# Patient Record
Sex: FEMALE | Race: BLACK OR AFRICAN AMERICAN | Employment: OTHER | URBAN - METROPOLITAN AREA
[De-identification: names, ages, dates, MRNs, and addresses within clinical notes are randomized per-mention and may not be internally consistent; named-entity substitution may affect disease eponyms.]

---

## 2017-08-13 ENCOUNTER — HOSPITAL ENCOUNTER (EMERGENCY)
Age: 82
Discharge: HOME | End: 2017-08-13
Attending: FAMILY MEDICINE

## 2017-08-13 VITALS
WEIGHT: 124 LBS | TEMPERATURE: 97 F | HEART RATE: 62 BPM | HEIGHT: 60 IN | OXYGEN SATURATION: 95 % | DIASTOLIC BLOOD PRESSURE: 56 MMHG | RESPIRATION RATE: 20 BRPM | SYSTOLIC BLOOD PRESSURE: 122 MMHG | BODY MASS INDEX: 24.35 KG/M2

## 2017-08-13 DIAGNOSIS — S00.83XA CONTUSION OF OTHER PART OF HEAD, INITIAL ENCOUNTER: Primary | ICD-10-CM

## 2017-08-13 RX ORDER — GUAIFENESIN 100 MG/5ML
81 LIQUID (ML) ORAL EVERY EVENING
COMMUNITY
End: 2018-02-23

## 2017-08-13 RX ORDER — DIAZEPAM 5 MG/1
5 TABLET ORAL
COMMUNITY
End: 2018-02-21 | Stop reason: DRUGHIGH

## 2017-08-13 NOTE — DISCHARGE INSTRUCTIONS
Head Injury: Care Instructions  Your Care Instructions  Most injuries to the head are minor. Bumps, cuts, and scrapes on the head and face usually heal well and can be treated the same as injuries to other parts of the body. Although it's rare, once in a while a more serious problem shows up after you are home. So it's good to be on the lookout for symptoms for a day or two. Follow-up care is a key part of your treatment and safety. Be sure to make and go to all appointments, and call your doctor if you are having problems. It's also a good idea to know your test results and keep a list of the medicines you take. How can you care for yourself at home? · Follow your doctor's instructions. He or she will tell you if you need someone to watch you closely for the next 24 hours or longer. · Take it easy for the next few days or more if you are not feeling well. · Ask your doctor when it's okay for you to go back to activities like driving a car, riding a bike, or operating machinery. When should you call for help? Call 911 anytime you think you may need emergency care. For example, call if:  · You have a seizure. · You passed out (lost consciousness). · You are confused or can't stay awake. Call your doctor now or seek immediate medical care if:  · You have new or worse vomiting. · You feel less alert. · You have new weakness or numbness in any part of your body. Watch closely for changes in your health, and be sure to contact your doctor if:  · You do not get better as expected. · You have new symptoms, such as headaches, trouble concentrating, or changes in mood. Where can you learn more? Go to http://vladimir-reji.info/. Enter L599 in the search box to learn more about \"Head Injury: Care Instructions. \"  Current as of: October 14, 2016  Content Version: 11.3  © 9626-4010 [employer].  Care instructions adapted under license by [employer] (which disclaims liability or warranty for this information). If you have questions about a medical condition or this instruction, always ask your healthcare professional. [place] [address] any warranty or liability for your use of this information.

## 2017-08-20 NOTE — UC PROVIDER NOTE
Patient is a 80 y.o. female presenting with fall. The history is provided by a caregiver and a relative. The history is limited by the condition of the patient. Fall   The accident occurred 1 to 2 hours ago. The fall occurred while standing. She fell from a height of ground level. She landed on hard floor. There was no blood loss. The point of impact was the head. The pain is mild. She was ambulatory at the scene. There was no entrapment after the fall. There was no drug use involved in the accident. There was no alcohol use involved in the accident. Pertinent negatives include no visual change, no nausea, no vomiting, no headaches, no extremity weakness, no loss of consciousness and no laceration. The risk factors include dementia and being elderly. She has tried ice for the symptoms. Past Medical History:   Diagnosis Date    Alzheimer disease     Hypertension         Past Surgical History:   Procedure Laterality Date    HX ORTHOPAEDIC      bilateral knee replacement         Family History   Problem Relation Age of Onset    Dementia Father         Social History     Social History    Marital status:      Spouse name: N/A    Number of children: N/A    Years of education: N/A     Occupational History    Not on file. Social History Main Topics    Smoking status: Never Smoker    Smokeless tobacco: Never Used    Alcohol use No    Drug use: Not on file    Sexual activity: Not on file     Other Topics Concern    Not on file     Social History Narrative                ALLERGIES: Review of patient's allergies indicates no known allergies. Review of Systems   Gastrointestinal: Negative for nausea and vomiting. Musculoskeletal: Negative for extremity weakness. Neurological: Negative for loss of consciousness and headaches. All other systems reviewed and are negative.       Vitals:    08/13/17 1541 08/13/17 1544   BP:  122/56   Pulse:  62   Resp:  20   Temp:  97 °F (36.1 °C)   SpO2: 95%   Weight: 56.2 kg (124 lb)    Height: 5' (1.524 m)        Physical Exam   Constitutional: She is oriented to person, place, and time. She appears well-developed and well-nourished. HENT:   Head: Normocephalic. Head is with contusion (with mild edema- no bruise and no tenderness). Head is without abrasion and without laceration. Right Ear: External ear normal.   Left Ear: External ear normal.   Mouth/Throat: Oropharynx is clear and moist. No oropharyngeal exudate. Eyes: Conjunctivae and EOM are normal. Pupils are equal, round, and reactive to light. Right eye exhibits no discharge. Left eye exhibits no discharge. No scleral icterus. Neck: Normal range of motion. No tracheal deviation present. No thyromegaly present. Cardiovascular: Normal rate, regular rhythm and normal heart sounds. No murmur heard. Pulmonary/Chest: Effort normal and breath sounds normal. No respiratory distress. She has no wheezes. She has no rales. She exhibits no tenderness. Abdominal: Soft. Bowel sounds are normal. She exhibits no distension. There is no tenderness. There is no rebound and no guarding. Musculoskeletal: Normal range of motion. She exhibits no edema or tenderness. Lymphadenopathy:     She has no cervical adenopathy. Neurological: She is alert and oriented to person, place, and time. No cranial nerve deficit. Coordination normal.   Skin: Skin is warm. No laceration noted. No erythema. Psychiatric: She has a normal mood and affect. Her behavior is normal. Judgment and thought content normal.   Nursing note and vitals reviewed.       MDM     Differential Diagnosis; Clinical Impression; Plan:     CLINICAL IMPRESSION:  Contusion of other part of head, initial encounter  (primary encounter diagnosis)      DDX    Plan:    Reassured- observe mental status and also for N/v  Tylenol/ ice as needed  Amount and/or Complexity of Data Reviewed:    Review and summarize past medical records:  Yes  Risk of Significant Complications, Morbidity, and/or Mortality:   Presenting problems: Moderate  Management options:   Moderate  Progress:   Patient progress:  Stable      Procedures

## 2018-02-21 ENCOUNTER — APPOINTMENT (OUTPATIENT)
Dept: GENERAL RADIOLOGY | Age: 83
DRG: 066 | End: 2018-02-21
Attending: EMERGENCY MEDICINE
Payer: MEDICARE

## 2018-02-21 ENCOUNTER — HOSPITAL ENCOUNTER (INPATIENT)
Age: 83
LOS: 2 days | Discharge: HOME HEALTH | DRG: 066 | End: 2018-02-23
Attending: EMERGENCY MEDICINE | Admitting: INTERNAL MEDICINE
Payer: MEDICARE

## 2018-02-21 ENCOUNTER — APPOINTMENT (OUTPATIENT)
Dept: CT IMAGING | Age: 83
DRG: 066 | End: 2018-02-21
Attending: EMERGENCY MEDICINE
Payer: MEDICARE

## 2018-02-21 DIAGNOSIS — G30.1 LATE ONSET ALZHEIMER'S DISEASE WITHOUT BEHAVIORAL DISTURBANCE (HCC): ICD-10-CM

## 2018-02-21 DIAGNOSIS — R77.8 ELEVATED TROPONIN I LEVEL: ICD-10-CM

## 2018-02-21 DIAGNOSIS — I63.9 CEREBROVASCULAR ACCIDENT (CVA), UNSPECIFIED MECHANISM (HCC): Primary | ICD-10-CM

## 2018-02-21 DIAGNOSIS — F02.80 LATE ONSET ALZHEIMER'S DISEASE WITHOUT BEHAVIORAL DISTURBANCE (HCC): ICD-10-CM

## 2018-02-21 LAB
ANION GAP SERPL CALC-SCNC: 7 MMOL/L (ref 5–15)
APTT PPP: 27.2 SEC (ref 22.1–32)
BASOPHILS # BLD: 0 K/UL (ref 0–0.1)
BASOPHILS NFR BLD: 0 % (ref 0–1)
BUN SERPL-MCNC: 23 MG/DL (ref 6–20)
BUN/CREAT SERPL: 19 (ref 12–20)
CALCIUM SERPL-MCNC: 8.7 MG/DL (ref 8.5–10.1)
CHLORIDE SERPL-SCNC: 111 MMOL/L (ref 97–108)
CK SERPL-CCNC: 80 U/L (ref 26–192)
CO2 SERPL-SCNC: 27 MMOL/L (ref 21–32)
CREAT SERPL-MCNC: 1.22 MG/DL (ref 0.55–1.02)
DIFFERENTIAL METHOD BLD: ABNORMAL
EOSINOPHIL # BLD: 0.2 K/UL (ref 0–0.4)
EOSINOPHIL NFR BLD: 3 % (ref 0–7)
ERYTHROCYTE [DISTWIDTH] IN BLOOD BY AUTOMATED COUNT: 14.6 % (ref 11.5–14.5)
GLUCOSE SERPL-MCNC: 85 MG/DL (ref 65–100)
HCT VFR BLD AUTO: 31.2 % (ref 35–47)
HGB BLD-MCNC: 9.9 G/DL (ref 11.5–16)
IMM GRANULOCYTES # BLD: 0 K/UL (ref 0–0.04)
IMM GRANULOCYTES NFR BLD AUTO: 0 % (ref 0–0.5)
INR PPP: 1 (ref 0.9–1.1)
LYMPHOCYTES # BLD: 1.7 K/UL (ref 0.8–3.5)
LYMPHOCYTES NFR BLD: 29 % (ref 12–49)
MCH RBC QN AUTO: 31 PG (ref 26–34)
MCHC RBC AUTO-ENTMCNC: 31.7 G/DL (ref 30–36.5)
MCV RBC AUTO: 97.8 FL (ref 80–99)
MONOCYTES # BLD: 0.4 K/UL (ref 0–1)
MONOCYTES NFR BLD: 8 % (ref 5–13)
NEUTS SEG # BLD: 3.5 K/UL (ref 1.8–8)
NEUTS SEG NFR BLD: 60 % (ref 32–75)
NRBC # BLD: 0 K/UL (ref 0–0.01)
NRBC BLD-RTO: 0 PER 100 WBC
PLATELET # BLD AUTO: 163 K/UL (ref 150–400)
PMV BLD AUTO: 11.3 FL (ref 8.9–12.9)
POTASSIUM SERPL-SCNC: 4.2 MMOL/L (ref 3.5–5.1)
PROTHROMBIN TIME: 10.3 SEC (ref 9–11.1)
RBC # BLD AUTO: 3.19 M/UL (ref 3.8–5.2)
SODIUM SERPL-SCNC: 145 MMOL/L (ref 136–145)
THERAPEUTIC RANGE,PTTT: NORMAL SECS (ref 58–77)
TROPONIN I SERPL-MCNC: 0.13 NG/ML
WBC # BLD AUTO: 5.8 K/UL (ref 3.6–11)

## 2018-02-21 PROCEDURE — 71045 X-RAY EXAM CHEST 1 VIEW: CPT

## 2018-02-21 PROCEDURE — 36415 COLL VENOUS BLD VENIPUNCTURE: CPT | Performed by: EMERGENCY MEDICINE

## 2018-02-21 PROCEDURE — 85730 THROMBOPLASTIN TIME PARTIAL: CPT | Performed by: EMERGENCY MEDICINE

## 2018-02-21 PROCEDURE — 74011250637 HC RX REV CODE- 250/637: Performed by: EMERGENCY MEDICINE

## 2018-02-21 PROCEDURE — 94762 N-INVAS EAR/PLS OXIMTRY CONT: CPT

## 2018-02-21 PROCEDURE — 65270000029 HC RM PRIVATE

## 2018-02-21 PROCEDURE — 80048 BASIC METABOLIC PNL TOTAL CA: CPT | Performed by: EMERGENCY MEDICINE

## 2018-02-21 PROCEDURE — 84484 ASSAY OF TROPONIN QUANT: CPT | Performed by: EMERGENCY MEDICINE

## 2018-02-21 PROCEDURE — 85025 COMPLETE CBC W/AUTO DIFF WBC: CPT | Performed by: EMERGENCY MEDICINE

## 2018-02-21 PROCEDURE — 96374 THER/PROPH/DIAG INJ IV PUSH: CPT

## 2018-02-21 PROCEDURE — 85610 PROTHROMBIN TIME: CPT | Performed by: EMERGENCY MEDICINE

## 2018-02-21 PROCEDURE — 70450 CT HEAD/BRAIN W/O DYE: CPT

## 2018-02-21 PROCEDURE — 93005 ELECTROCARDIOGRAM TRACING: CPT

## 2018-02-21 PROCEDURE — 99285 EMERGENCY DEPT VISIT HI MDM: CPT

## 2018-02-21 PROCEDURE — 74011250636 HC RX REV CODE- 250/636: Performed by: EMERGENCY MEDICINE

## 2018-02-21 PROCEDURE — 82550 ASSAY OF CK (CPK): CPT | Performed by: EMERGENCY MEDICINE

## 2018-02-21 RX ORDER — ONDANSETRON 2 MG/ML
4 INJECTION INTRAMUSCULAR; INTRAVENOUS
Status: DISCONTINUED | OUTPATIENT
Start: 2018-02-21 | End: 2018-02-23 | Stop reason: HOSPADM

## 2018-02-21 RX ORDER — BISOPROLOL FUMARATE AND HYDROCHLOROTHIAZIDE 5; 6.25 MG/1; MG/1
1 TABLET ORAL DAILY
COMMUNITY

## 2018-02-21 RX ORDER — DIAZEPAM 5 MG/1
5 TABLET ORAL 2 TIMES DAILY
Status: DISCONTINUED | OUTPATIENT
Start: 2018-02-22 | End: 2018-02-23 | Stop reason: HOSPADM

## 2018-02-21 RX ORDER — ACETAMINOPHEN 325 MG/1
650 TABLET ORAL
Status: DISCONTINUED | OUTPATIENT
Start: 2018-02-21 | End: 2018-02-23 | Stop reason: HOSPADM

## 2018-02-21 RX ORDER — HEPARIN SODIUM 5000 [USP'U]/ML
5000 INJECTION, SOLUTION INTRAVENOUS; SUBCUTANEOUS EVERY 8 HOURS
Status: DISCONTINUED | OUTPATIENT
Start: 2018-02-21 | End: 2018-02-23 | Stop reason: HOSPADM

## 2018-02-21 RX ORDER — BISOPROLOL FUMARATE AND HYDROCHLOROTHIAZIDE 5; 6.25 MG/1; MG/1
1 TABLET ORAL DAILY
Status: DISCONTINUED | OUTPATIENT
Start: 2018-02-22 | End: 2018-02-23 | Stop reason: HOSPADM

## 2018-02-21 RX ORDER — LABETALOL HYDROCHLORIDE 5 MG/ML
5 INJECTION, SOLUTION INTRAVENOUS
Status: DISCONTINUED | OUTPATIENT
Start: 2018-02-21 | End: 2018-02-23 | Stop reason: HOSPADM

## 2018-02-21 RX ORDER — ASCORBIC ACID 500 MG
500 TABLET ORAL DAILY
Status: DISCONTINUED | OUTPATIENT
Start: 2018-02-22 | End: 2018-02-23 | Stop reason: HOSPADM

## 2018-02-21 RX ORDER — ASCORBIC ACID 500 MG
500 TABLET ORAL DAILY
COMMUNITY

## 2018-02-21 RX ORDER — ACETAMINOPHEN 650 MG/1
650 SUPPOSITORY RECTAL
Status: DISCONTINUED | OUTPATIENT
Start: 2018-02-21 | End: 2018-02-23 | Stop reason: HOSPADM

## 2018-02-21 RX ORDER — LORAZEPAM 2 MG/ML
1 INJECTION INTRAMUSCULAR ONCE
Status: COMPLETED | OUTPATIENT
Start: 2018-02-21 | End: 2018-02-21

## 2018-02-21 RX ORDER — SODIUM CHLORIDE 9 MG/ML
50 INJECTION, SOLUTION INTRAVENOUS CONTINUOUS
Status: DISPENSED | OUTPATIENT
Start: 2018-02-21 | End: 2018-02-22

## 2018-02-21 RX ORDER — DIAZEPAM 5 MG/5ML
5 SOLUTION ORAL
COMMUNITY

## 2018-02-21 RX ORDER — CLOPIDOGREL BISULFATE 75 MG/1
75 TABLET ORAL DAILY
Status: DISCONTINUED | OUTPATIENT
Start: 2018-02-22 | End: 2018-02-23 | Stop reason: HOSPADM

## 2018-02-21 RX ORDER — ENOXAPARIN SODIUM 100 MG/ML
30 INJECTION SUBCUTANEOUS EVERY 24 HOURS
Status: DISCONTINUED | OUTPATIENT
Start: 2018-02-22 | End: 2018-02-21

## 2018-02-21 RX ORDER — DIAZEPAM 2.5 MG/.5ML
5 GEL RECTAL
Status: DISCONTINUED | OUTPATIENT
Start: 2018-02-21 | End: 2018-02-21

## 2018-02-21 RX ORDER — SODIUM CHLORIDE 0.9 % (FLUSH) 0.9 %
5-10 SYRINGE (ML) INJECTION AS NEEDED
Status: DISCONTINUED | OUTPATIENT
Start: 2018-02-21 | End: 2018-02-23 | Stop reason: HOSPADM

## 2018-02-21 RX ORDER — ASPIRIN 300 MG/1
300 SUPPOSITORY RECTAL
Status: COMPLETED | OUTPATIENT
Start: 2018-02-21 | End: 2018-02-21

## 2018-02-21 RX ORDER — SODIUM CHLORIDE 0.9 % (FLUSH) 0.9 %
5-10 SYRINGE (ML) INJECTION EVERY 8 HOURS
Status: DISCONTINUED | OUTPATIENT
Start: 2018-02-21 | End: 2018-02-23 | Stop reason: HOSPADM

## 2018-02-21 RX ADMIN — LORAZEPAM 1 MG: 2 INJECTION INTRAMUSCULAR; INTRAVENOUS at 21:12

## 2018-02-21 RX ADMIN — ASPIRIN 300 MG: 300 SUPPOSITORY RECTAL at 19:52

## 2018-02-21 NOTE — IP AVS SNAPSHOT
[address] [facility] 
[phone] Patient: Po Hull MRN: [id number] JCL:[birth year] A check luz maria indicates which time of day the medication should be taken. My Medications START taking these medications Instructions Each Dose to Equal  
 Morning Noon Evening Bedtime  
 clopidogrel 75 mg Tab Commonly known as:  PLAVIX Your last dose was: Your next dose is: Take 1 Tab by mouth daily. 75 mg CHANGE how you take these medications Instructions Each Dose to Equal  
 Morning Noon Evening Bedtime  
 diazePAM 5 mg/5 mL (1 mg/mL, 5 mL) Soln oral solution Commonly known as:  VALIUM What changed:  Another medication with the same name was removed. Continue taking this medication, and follow the directions you see here. Your last dose was: Your next dose is: Take 5 mg by mouth two (2) times daily as needed (Anxiety). 5 mg CONTINUE taking these medications Instructions Each Dose to Equal  
 Morning Noon Evening Bedtime ADULT ONE DAILY GUMMIES PO Your last dose was: Your next dose is: Take 1 Tab by mouth daily. 1 Tab  
    
   
   
   
  
 bisoprolol-hydroCHLOROthiazide 5-6.25 mg per tablet Commonly known as:  [facility] Your last dose was: Your next dose is: Take 1 Tab by mouth daily. 1 Tab VITAMIN C 500 mg tablet Generic drug:  ascorbic acid (vitamin C) Your last dose was: Your next dose is: Take 500 mg by mouth daily. 500 mg  
    
   
   
   
  
  
STOP taking these medications   
 aspirin 81 mg chewable tablet Where to Get Your Medications These medications were sent to [facility]/pharmacy #[id number] Stefani, [address] [facility] [address] Phone:  [phone]  
  clopidogrel 75 mg Tab

## 2018-02-21 NOTE — IP AVS SNAPSHOT
[address] [facility] 
[phone] Patient: Virgie Cohen MRN: [id number] WD:4/40/2143 About your hospitalization You were admitted on:  February 21, 2018 You last received care in the:  [facility] 3 NEUROSCIENCE TELEMETRY You were discharged on:  February 23, 2018 Why you were hospitalized Your primary diagnosis was:  Not on File Your diagnoses also included:  Stroke (Cerebrum) (Hcc) Follow-up Information Follow up With Details Comments Contact Info Jalil Goldsmith MD Go on 2/26/2018 Please follow up on February 26, 2018 at 10:45 [address] [address] 
[phone] Rusty Sarkar MD  Office will call to confirm follow up appointment date and time [address] 
[phone] AT HOME CARE  PT, OT and Nursing services [address] [place] [address] 
[phone] Discharge Orders None A check luz maria indicates which time of day the medication should be taken. My Medications START taking these medications Instructions Each Dose to Equal  
 Morning Noon Evening Bedtime  
 clopidogrel 75 mg Tab Commonly known as:  PLAVIX Your last dose was: Your next dose is: Take 1 Tab by mouth daily. 75 mg CHANGE how you take these medications Instructions Each Dose to Equal  
 Morning Noon Evening Bedtime  
 diazePAM 5 mg/5 mL (1 mg/mL, 5 mL) Soln oral solution Commonly known as:  VALIUM What changed:  Another medication with the same name was removed. Continue taking this medication, and follow the directions you see here. Your last dose was: Your next dose is: Take 5 mg by mouth two (2) times daily as needed (Anxiety). 5 mg CONTINUE taking these medications Instructions Each Dose to Equal  
 Morning Noon Evening Bedtime ADULT ONE DAILY GUMMIES PO Your last dose was: Your next dose is: Take 1 Tab by mouth daily. 1 Tab  
    
   
   
   
  
 bisoprolol-hydroCHLOROthiazide 5-6.25 mg per tablet Commonly known as:  [facility] Your last dose was: Your next dose is: Take 1 Tab by mouth daily. 1 Tab VITAMIN C 500 mg tablet Generic drug:  ascorbic acid (vitamin C) Your last dose was: Your next dose is: Take 500 mg by mouth daily. 500 mg  
    
   
   
   
  
  
STOP taking these medications   
 aspirin 81 mg chewable tablet Where to Get Your Medications These medications were sent to [facility]/pharmacy #[id number] Jayde Jose [address], [address] [place] [address] Phone:  [phone]  
  clopidogrel 75 mg Tab Discharge Instructions Discharge Instructions PATIENT ID: Nas Alexandre MRN: [id number] YOB: 1916 DATE OF ADMISSION: 2/21/2018  3:56 PM   
DATE OF DISCHARGE: 2/23/2018 PRIMARY CARE PROVIDER: Luciana Patel MD  
 
ATTENDING PHYSICIAN: Ramana Espinosa MD 
DISCHARGING PROVIDER: Ramana Espinosa MD   
To contact this individual call [phone] and ask the  to page. If unavailable ask to be transferred the Adult Hospitalist Department. 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 
 
 
CONSULTATIONS: IP CONSULT TO NEUROLOGY PROCEDURES/SURGERIES: * No surgery found * PENDING TEST RESULTS:  
At the time of discharge the following test results are still pending: None. FOLLOW UP APPOINTMENTS:  
Follow-up Information Follow up With Details Comments Contact Info Luciana Patel MD Schedule an appointment as soon as possible for a visit in 1 week  [address] [place] [address] 
[phone] ADDITIONAL CARE RECOMMENDATIONS:  
  Stop aspirin, you were started on a new medication for stroke called Plavix. The biggest side effect or risk is GI bleeds. If you notice dark stool or bleeding, please either call your primary care provider or come to the hospital for further evaluation. DIET: Dysphagia 3, mechanical soft, thin liquids ACTIVITY: PT/OT per Home Health 
 
WOUND CARE: N/A 
 
EQUIPMENT needed: N/A 
 
 
  
 SNF/Inpatient Rehab/LTAC Independent/assisted living Hospice Other: CDMP Checked:  
Yes x PROBLEM LIST Updated: 
Yes x Signed:  
Araceli Murray MD 
2/23/2018 
9:26 AM 
 
  
  
  
"[employer]" Announcement We are excited to announce that we are making your provider's discharge notes available to you in "[employer]". You will see these notes when they are completed and signed by the physician that discharged you from your recent hospital stay. If you have any questions or concerns about any information you see in "[employer]", please call the Health Information Department where you were seen or reach out to your Primary Care Provider for more information about your plan of care. Introducing [facility]! [facility] introduces [employer] Plus patient portal. Now you can access parts of your medical record, email your doctor's office, and request medication refills online. 1. In your internet browser, go to [email or web address] 2. Click on the First Time User? Click Here link in the Sign In box. You will see the New Member Sign Up page. 3. Enter your 3C Plus Access Code exactly as it appears below. You will not need to use this code after youve completed the sign-up process. If you do not sign up before the expiration date, you must request a new code. · 3C Plus Access Code: [email or web address] Expires: 5/24/2018  9:27 AM 
 
4. Enter the last four digits of your Social Security Number (xxxx) and Date of Birth (mm/dd/yyyy) as indicated and click Submit. You will be taken to the next sign-up page. 5. Create a 3C Plus ID. This will be your 3C Plus login ID and cannot be changed, so think of one that is secure and easy to remember. 6. Create a 3C Plus password. You can change your password at any time. 7. Enter your Password Reset Question and Answer. This can be used at a later time if you forget your password. 8. Enter your e-mail address. You will receive e-mail notification when new information is available in [address] 9. Click Sign Up. You can now view and download portions of your medical record. 10. Click the Download Summary menu link to download a portable copy of your medical information. If you have questions, please visit the Frequently Asked Questions section of the 3C Plus website. Remember, 3C Plus is NOT to be used for urgent needs. For medical emergencies, dial 911. Now available from your iPhone and Android! Providers Seen During Your Hospitalization Provider Specialty Primary office phone Christina Ott. Dante Burton MD Emergency Medicine [phone] Ramana Espinosa MD Internal Medicine [phone] Your Primary Care Physician (PCP) Primary Care Physician Office Phone Office Fax Kurt GATES [phone] You are allergic to the following No active allergies Recent Documentation Height Weight BMI OB Status Smoking Status 1.549 m 62.1 kg 25.87 kg/m2 Postmenopausal Never Smoker Emergency Contacts Name Discharge Info Relation Home Work Mobile Marianne Ag DISCHARGE CAREGIVER [3] Child [2] [phone] Patient Belongings The following personal items are in your possession at time of discharge: 
  Dental Appliances: Lowers, Uppers (in mouth)  Visual Aid: None      Home Medications: None   Jewelry: Ring (one metal)  Clothing: None    Other Valuables: None Please provide this summary of care documentation to your next provider. Signatures-by signing, you are acknowledging that this After Visit Summary has been reviewed with you and you have received a copy. Patient Signature:  ____________________________________________________________ Date:  ____________________________________________________________  
  
[facility] Current Provider Signature:  ____________________________________________________________ Date:  ____________________________________________________________

## 2018-02-21 NOTE — PROGRESS NOTES
Spiritual Care Assessment/Progress Notes    Jeffrey Lui [id number]  [id number]    1/20/1916  80 y.o.  female    Patient Telephone Number: [phone] (home)   Sabianist Affiliation: Jim Coleman   Language: [place]   Extended Emergency Contact Information  Primary Emergency Contact: Marianne Ag  Address: [address] Phone: [phone]  Relation: Child   Patient Active Problem List    Diagnosis Date Noted    Hip fracture ([address] 09/27/2013    Dementia 09/27/2013    HTN (hypertension) 09/27/2013        Date: 2/21/2018       Level of Sabianist/Spiritual Activity:  []         Involved in courtney tradition/spiritual practice    []         Not involved in courtney tradition/spiritual practice  []         Spiritually oriented    []         Claims no spiritual orientation    []         seeking spiritual identity  []         Feels alienated from Orthodoxy practice/tradition  []         Feels angry about Orthodoxy practice/tradition  []         Spirituality/Orthodoxy tradition a resource for coping at this time.   [x]         Not able to assess due to medical condition    Services Provided Today:  [x]         crisis intervention    []         reading Scriptures  []         spiritual assessment    []         prayer  [x]         empathic listening/emotional support  []         rites and rituals (cite in comments)  []         life review     []         Orthodoxy support  []         theological development   []         advocacy  []         ethical dialog     []         blessing  []         bereavement support    [x]         support to family  []         anticipatory grief support   []         help with AMD  []         spiritual guidance    []         meditation      Spiritual Care Needs  [x]         Emotional Support  []         Spiritual/Sabianist Care  []         Loss/Adjustment  []         Advocacy/Referral                /Ethics  []         No needs expressed at               this time  []         Other: (note in               comments)  [address]  []         Follow up visits with               pt/family  []         Provide materials  []         Schedule sacraments  []         Contact Community               Clergy  [x]         Follow up as needed  []         Other: (note in               comments)   Responded to Code S in [facility]. Assisted in accommodating pt's daughter as pt was being assessed by clinical staff. Explained 's role and advised of availability. Unable to engage further due to activity. Will follow up as needed. CARLOS A Landon. Div

## 2018-02-21 NOTE — ED PROVIDER NOTES
EMERGENCY DEPARTMENT HISTORY AND PHYSICAL EXAM      Date: 2/21/2018  Patient Name: Tyree Lozano    History of Presenting Illness     Chief Complaint   Patient presents with    Facial Droop       History Provided By: EMS    HPI: Tyree Lozano, 80 y.o. female with PMHx significant for HTN and Alzheimer's disease, presents via EMS to the ED with cc of a sudden onset slurred speech and R facial droop x 45 mins PTA. EMS reports pt's BS was 97 and BP was 124/78. They note pt takes 5mg of valium twice daily and had one dose this morning. EMS also states pt was combative during transport. Social hx: -Tobacco -EtOH, -Drugs    PCP: Vani Sánchez MD    History of Present Illness is limited secondary to pt's dementia and cooperation.      Current Facility-Administered Medications   Medication Dose Route Frequency Provider Last Rate Last Dose    0.9% sodium chloride infusion  50 mL/hr IntraVENous CONTINUOUS Ulises Olivo MD        sodium chloride (NS) flush 5-10 mL  5-10 mL IntraVENous Q8H Ulises Olivo MD        sodium chloride (NS) flush 5-10 mL  5-10 mL IntraVENous PRN Ulises Olivo MD        ondansetron [facility]) injection 4 mg  4 mg IntraVENous Q6H PRN Ulises Olivo MD        labetalol (NORMODYNE;TRANDATE) injection 5 mg  5 mg IntraVENous Q10MIN PRN Ulises Olivo MD        acetaminophen (TYLENOL) tablet 650 mg  650 mg Oral Q4H PRN Ulises Olivo MD        Or    acetaminophen (TYLENOL) solution 650 mg  650 mg Per NG tube Q4H PRN Ulises Olivo MD        Or    acetaminophen (TYLENOL) suppository 650 mg  650 mg Rectal Q4H PRN Ulises Olivo MD        clopidogrel (PLAVIX) tablet 75 mg  75 mg Oral DAILY Ulises Olivo MD        ascorbic acid (vitamin C) (VITAMIN C) tablet 500 mg  500 mg Oral DAILY Ulises Olivo MD        bisoprolol-hydroCHLOROthiazide [facility]) 5-6.25 mg per tablet 1 Tab  1 Tab Oral DAILY Ulises Olivo MD       [employer] diazePAM (VALIUM) tablet 5 mg  5 mg Oral BID Horris Pallas, MD        heparin (porcine) injection 5,000 Units  5,000 Units SubCUTAneous Q8H Horris Pallas, MD         Current Outpatient Prescriptions   Medication Sig Dispense Refill    diazePAM (VALIUM) 5 mg/5 mL (1 mg/mL, 5 mL) soln oral solution Take 5 mg by mouth two (2) times daily as needed (Anxiety).  bisoprolol-hydroCHLOROthiazide (ZIAC) 5-6.25 mg per tablet Take 1 Tab by mouth daily.  MULTIVIT-MINERALS/FOLIC ACID (ADULT ONE DAILY GUMMIES PO) Take 1 Tab by mouth daily.  ascorbic acid, vitamin C, (VITAMIN C) 500 mg tablet Take 500 mg by mouth daily.  aspirin 81 mg chewable tablet Take 81 mg by mouth every evening. Past History     Past Medical History:  Past Medical History:   Diagnosis Date    Alzheimer disease     Hypertension        Past Surgical History:  Past Surgical History:   Procedure Laterality Date    HX ORTHOPAEDIC      bilateral knee replacement       Family History:  Family History   Problem Relation Age of Onset    Dementia Father        Social History:  Social History   Substance Use Topics    Smoking status: Never Smoker    Smokeless tobacco: Never Used    Alcohol use No       Allergies:  No Known Allergies      Review of Systems   Review of Systems   Unable to perform ROS: Dementia       Physical Exam   Physical Exam   Nursing note and vitals reviewed.   General appearance: non-toxic, mild dysarthria when conversing w/ family  Eyes: PERRL, EOMI appear intact (limited cooperation), anicteric sclera  HEENT: mucous membranes not visualized due to cooperation, lips normal  Pulmonary: clear to auscultation bilaterally  Cardiac: normal rate and regular rhythm, no murmurs, gallops, or rubs, 2+ peripheral pulses, cap refill < 2 seconds  Abdomen: soft, nontender, nondistended, bowel sounds present  MSK: able to move all 4 extremities  Neuro: Neuro exam is limited secondary to pt's hx of dementia and cooperation. Pt is extremely strong, requiring multiple staff to restrain of all four extremities, borderline R facial asymmetry, pt verbalizes to get out of her face on exam.     Diagnostic Study Results     Labs -     Recent Results (from the past 12 hour(s))   CBC WITH AUTOMATED DIFF    Collection Time: 02/21/18  4:33 PM   Result Value Ref Range    WBC 5.8 3.6 - 11.0 K/uL    RBC 3.19 (L) 3.80 - 5.20 M/uL    HGB 9.9 (L) 11.5 - 16.0 g/dL    HCT 31.2 (L) 35.0 - 47.0 %    MCV 97.8 80.0 - 99.0 FL    MCH 31.0 26.0 - 34.0 PG    MCHC 31.7 30.0 - 36.5 g/dL    RDW 14.6 (H) 11.5 - 14.5 %    PLATELET 904 942 - 997 K/uL    MPV 11.3 8.9 - 12.9 FL    NRBC 0.0 0  WBC    ABSOLUTE NRBC 0.00 0.00 - 0.01 K/uL    NEUTROPHILS 60 32 - 75 %    LYMPHOCYTES 29 12 - 49 %    MONOCYTES 8 5 - 13 %    EOSINOPHILS 3 0 - 7 %    BASOPHILS 0 0 - 1 %    IMMATURE GRANULOCYTES 0 0.0 - 0.5 %    ABS. NEUTROPHILS 3.5 1.8 - 8.0 K/UL    ABS. LYMPHOCYTES 1.7 0.8 - 3.5 K/UL    ABS. MONOCYTES 0.4 0.0 - 1.0 K/UL    ABS. EOSINOPHILS 0.2 0.0 - 0.4 K/UL    ABS. BASOPHILS 0.0 0.0 - 0.1 K/UL    ABS. IMM.  GRANS. 0.0 0.00 - 0.04 K/UL    DF AUTOMATED     METABOLIC PANEL, BASIC    Collection Time: 02/21/18  4:33 PM   Result Value Ref Range    Sodium 145 136 - 145 mmol/L    Potassium 4.2 3.5 - 5.1 mmol/L    Chloride 111 (H) 97 - 108 mmol/L    CO2 27 21 - 32 mmol/L    Anion gap 7 5 - 15 mmol/L    Glucose 85 65 - 100 mg/dL    BUN 23 (H) 6 - 20 MG/DL    Creatinine 1.22 (H) 0.55 - 1.02 MG/DL    BUN/Creatinine ratio 19 12 - 20      GFR est AA 49 (L) >60 ml/min/1.73m2    GFR est non-AA 40 (L) >60 ml/min/1.73m2    Calcium 8.7 8.5 - 10.1 MG/DL   CK    Collection Time: 02/21/18  4:33 PM   Result Value Ref Range    CK 80 26 - 192 U/L   TROPONIN I    Collection Time: 02/21/18  4:33 PM   Result Value Ref Range    Troponin-I, Qt. 0.13 (H) <0.05 ng/mL   PROTHROMBIN TIME + INR    Collection Time: 02/21/18  4:52 PM   Result Value Ref Range    INR 1.0 0.9 - 1.1      Prothrombin time 10.3 9.0 - 11.1 sec   PTT    Collection Time: 02/21/18  4:52 PM   Result Value Ref Range    aPTT 27.2 22.1 - 32.0 sec    aPTT, therapeutic range     58.0 - 77.0 SECS   EKG, 12 LEAD, INITIAL    Collection Time: 02/21/18  5:37 PM   Result Value Ref Range    Ventricular Rate 87 BPM    Atrial Rate 87 BPM    P-R Interval 240 ms    QRS Duration 72 ms    Q-T Interval 372 ms    QTC Calculation (Bezet) 447 ms    Calculated P Axis 45 degrees    Calculated R Axis -65 degrees    Calculated T Axis 138 degrees    Diagnosis       Sinus rhythm with 1st degree AV block  Left anterior fascicular block  Minimal voltage criteria for LVH, may be normal variant  Septal infarct , age undetermined  When compared with ECG of 27-SEP-2013 19:04,  premature atrial complexes are no longer present  Septal infarct is now present  Non-specific change in ST segment in Anterolateral leads  Nonspecific T wave abnormality, worse in Inferior leads  Inverted T waves have replaced nonspecific T wave abnormality in Lateral   leads         Radiologic Studies -     CT Results  (Last 48 hours)               02/21/18 1609  CT CODE NEURO HEAD WO CONTRAST Final result    Impression:  IMPRESSION: Atrophy and chronic small vessel ischemic disease the white matter. Artifact versus true diminished attenuation in left anterolateral aspect of   superior iker. The possibility of acute or subacute infarction in this area is   not excluded. Narrative:  EXAM:  CT CODE NEURO HEAD WO CONTRAST       INDICATION:  Acute stroke with dysarthria, R facial droop       COMPARISON: CT 4/8/2016. CONTRAST:  None. TECHNIQUE: Unenhanced CT of the head was performed using 5 mm images. Brain and   bone windows were generated. CT dose reduction was achieved through use of a   standardized protocol tailored for this examination and automatic exposure   control for dose modulation.          FINDINGS:   There is moderate prominence of the ventricles and cortical sulci consistent   with atrophy. Moderately prominent bilateral cerebral periventricular diminished   attenuation is again shown consistent with chronic small vessel ischemic disease   the white matter. Chronic lacunar infarction is again shown in the left corona   radiata and lentiform nucleus. Patient motion as well as artifact arising from   bone window suboptimal assessment of the brainstem. Equivocal diminished   attenuation in the left anterior aspect of the superior iker is shown. There is   no intracranial hemorrhage, extra-axial collection, mass, mass effect or midline   shift. The basilar cisterns are open. Other than the pontine finding, there is   no unenhanced CT imaging evidence for acute infarction. . The bone windows   demonstrate no abnormalities. The visualized portions of the paranasal sinuses   and mastoid air cells are clear. CXR Results  (Last 48 hours)               02/21/18 1706  XR CHEST PORT Final result    Impression:  IMPRESSION: No acute findings. Narrative:  EXAM:  XR CHEST PORT       INDICATION:  Acute stroke       COMPARISON:  9/27/2013       FINDINGS: A portable AP radiograph of the chest was obtained at 1700 hours. There is no consolidation or pulmonary edema. There is no pneumothorax or   pleural effusion. Left lung base visualization is obscured by overlying hand and   wrist.  Cardiac size is mildly enlarged. Mild vascular calcification of the   thoracic aorta with minimal tortuosity is again shown. There is no hilar   enlargement. The bones are severely osteopenic. Medical Decision Making   I am the first provider for this patient. I reviewed the vital signs, available nursing notes, past medical history, past surgical history, family history and social history. Vital Signs-Reviewed the patient's vital signs.   Patient Vitals for the past 12 hrs:   Temp Pulse Resp BP SpO2   02/22/18 0010 - 64 19 - 96 %   02/21/18 2355 - 63 15 (!) 125/106 -   02/21/18 2315 - 65 18 - -   02/21/18 2305 - 65 17 - -   02/21/18 2245 - 64 16 - -   02/21/18 2230 - 65 18 - -   02/21/18 2215 - 66 19 - -   02/21/18 2200 - 65 18 - -   02/21/18 2145 - 66 17 - -   02/21/18 2045 - 67 19 (!) 170/106 95 %   02/21/18 2000 - 73 19 171/81 96 %   02/21/18 1945 - 65 17 157/76 96 %   02/21/18 1930 - 66 18 165/77 98 %   02/21/18 1915 - 67 21 153/81 97 %   02/21/18 1900 - 66 17 160/77 96 %   02/21/18 1612 98.5 °F (36.9 °C) 81 23 174/87 98 %       EKG interpretation: (Preliminary) 1737  Rhythm: sinus rhythm with 1st degree AV block; and regular . Rate (approx.): 87 bpm; Axis: normal; ME interval: 240 ms; QRS interval: 72 ms; ST/T wave: normal; Other findings: left anterior fascicular block; possible ischemia. Records Reviewed: Old Medical Records    Provider Notes (Medical Decision Making):   DDx: CVA, electrolyte disorder, UTI, dementia    ED Course:   Initial assessment performed. The patients presenting problems have been discussed, and they are in agreement with the care plan formulated and outlined with them. I have encouraged them to ask questions as they arise throughout their visit. Eugene Valentin  1/20/1916  Arrival time to ED: [address] [place] [address] Street: [phone]  Physician at Bedside: Joe Rush  CT Order Time: [address] Page: [phone]  ACT Call Back: [phone]  Paged tele neurology for a consult.     3831  D/W w/ daughter. Concern for risk of ICH w/ tPA. Family in agreement, does not want to pursue tPA. CONSULT NOTE:   3949 [facility] Wally Arnold MD spoke with Dr. Lambert Gomez,  Specialty: tele neurology  Discussed pt's hx, disposition, and available diagnostic and imaging results. Reviewed care plans. Consultant agrees with plans as outlined. Dr. Lambert Gomez states pt is not a candidate for tPA given CT indication of acute vs. subacute infarction and advises to admit pt for routine stroke workup. Written by Jessica Kang, ED Scribe, as dictated by [employer].  Wally Arnold, MD.    CONSULT NOTE:   7:14 PM  [employer]umps. Gely Sepulveda MD spoke with Dr. Edith Osgood,  Specialty: hospitalist  Discussed pt's hx, disposition, and available diagnostic and imaging results. Reviewed care plans. Consultant agrees with plans as outlined. Dr. Edith Osgood will admit pt. Written by Argentina Freeman ED Scribe, as dictated by [employer] B[employer]. Gely Sepulveda MD.    Critical Care Time:   0    Disposition:  7:14 PM   Patient is being admitted to the hospital by Dr. Edith Osgood. The results of their tests and reasons for their admission have been discussed with them and/or available family. They convey agreement and understanding for the need to be admitted and for their admission diagnosis. Consultation has been made with the inpatient physician specialist for hospitalization. PLAN: Admit to hospital.     Diagnosis     Clinical Impression:   1. Cerebrovascular accident (CVA), unspecified mechanism ([address]    2. Elevated troponin I level       Attestations: This note is prepared by Argentina Freeman, acting as Scribe for Minor Bumps. Gely Sepulveda MD.    The scribe's documentation has been prepared under my direction and personally reviewed by me in its entirety. I confirm that the note above accurately reflects all work, treatment, procedures, and medical decision making performed by me. [employer]umps.  Gely Sepulveda MD

## 2018-02-21 NOTE — ED TRIAGE NOTES
Assumed care of this patient. She is alert not oriented at this time. Patient arrived via EMS with c/o slurred speech that started at 3pm and facial droop. Patient was alerted as Code S and taken immediately to CT. EMS obtained glucose en route, 97. Patient's daughter at bedside to provide information. Patient lives at home with daughter.

## 2018-02-21 NOTE — PROGRESS NOTES
Pharmacy Clarification of Prior to Admission Medication Regimen     The patient was not interviewed regarding clarification of the prior to admission medication regimen due to AMS. Patient's daughters were present in room and obtained permission from patient to discuss drug regimen with visitor(s) present. Patient's daughter was questioned regarding the patient's use of any other inhalers, topical products, over the counter medications, herbal medications, vitamin products or ophthalmic/nasal/otic medication use. Information Obtained From: Patient's Daughter and Rx Query    Pertinent Pharmacy Findings:   MULTIVIT-MINERALS/FOLIC ACID (ADULT ONE DAILY GUMMIES PO): Patient's daughter stated that patient takes this agent at home, but was unsure of the strength. PTA medication list was corrected to the following:     Prior to Admission Medications   Prescriptions Last Dose Informant Patient Reported? Taking? MULTIVIT-MINERALS/FOLIC ACID (ADULT ONE DAILY GUMMIES PO) 2/21/2018 at Unknown time Child Yes Yes   Sig: Take 1 Tab by mouth daily. ascorbic acid, vitamin C, (VITAMIN C) 500 mg tablet 2/21/2018 at Unknown time Child Yes Yes   Sig: Take 500 mg by mouth daily. aspirin 81 mg chewable tablet 2/20/2018 at Unknown time Child Yes Yes   Sig: Take 81 mg by mouth every evening. bisoprolol-hydroCHLOROthiazide [facility]) 5-6.25 mg per tablet 2/21/2018 at Unknown time Child Yes Yes   Sig: Take 1 Tab by mouth daily. diazePAM (VALIUM) 5 mg/5 mL (1 mg/mL, 5 mL) soln oral solution 2/21/2018 at Unknown time Child Yes Yes   Sig: Take 5 mg by mouth two (2) times daily as needed (Anxiety).       Facility-Administered Medications: None          Thank you,  Rigo Smith, [facility]  Medication History Pharmacy Technician

## 2018-02-22 LAB
ATRIAL RATE: 87 BPM
CALCULATED P AXIS, ECG09: 45 DEGREES
CALCULATED R AXIS, ECG10: -65 DEGREES
CALCULATED T AXIS, ECG11: 138 DEGREES
DIAGNOSIS, 93000: NORMAL
P-R INTERVAL, ECG05: 240 MS
Q-T INTERVAL, ECG07: 372 MS
QRS DURATION, ECG06: 72 MS
QTC CALCULATION (BEZET), ECG08: 447 MS
VENTRICULAR RATE, ECG03: 87 BPM

## 2018-02-22 PROCEDURE — 97530 THERAPEUTIC ACTIVITIES: CPT

## 2018-02-22 PROCEDURE — 74011250636 HC RX REV CODE- 250/636: Performed by: INTERNAL MEDICINE

## 2018-02-22 PROCEDURE — 97161 PT EVAL LOW COMPLEX 20 MIN: CPT

## 2018-02-22 PROCEDURE — 74011250637 HC RX REV CODE- 250/637: Performed by: INTERNAL MEDICINE

## 2018-02-22 PROCEDURE — 74011250636 HC RX REV CODE- 250/636: Performed by: GENERAL ACUTE CARE HOSPITAL

## 2018-02-22 PROCEDURE — 65660000000 HC RM CCU STEPDOWN

## 2018-02-22 RX ORDER — LORAZEPAM 2 MG/ML
1 INJECTION INTRAMUSCULAR ONCE
Status: COMPLETED | OUTPATIENT
Start: 2018-02-22 | End: 2018-02-22

## 2018-02-22 RX ADMIN — Medication 10 ML: at 22:14

## 2018-02-22 RX ADMIN — SODIUM CHLORIDE 50 ML/HR: 900 INJECTION, SOLUTION INTRAVENOUS at 01:10

## 2018-02-22 RX ADMIN — LORAZEPAM 1 MG: 2 INJECTION INTRAMUSCULAR; INTRAVENOUS at 03:20

## 2018-02-22 RX ADMIN — Medication 10 ML: at 13:24

## 2018-02-22 RX ADMIN — HEPARIN SODIUM 5000 UNITS: 5000 INJECTION, SOLUTION INTRAVENOUS; SUBCUTANEOUS at 01:10

## 2018-02-22 RX ADMIN — DIAZEPAM 5 MG: 5 TABLET ORAL at 18:49

## 2018-02-22 RX ADMIN — HEPARIN SODIUM 5000 UNITS: 5000 INJECTION, SOLUTION INTRAVENOUS; SUBCUTANEOUS at 09:41

## 2018-02-22 RX ADMIN — HEPARIN SODIUM 5000 UNITS: 5000 INJECTION, SOLUTION INTRAVENOUS; SUBCUTANEOUS at 18:49

## 2018-02-22 NOTE — H&P
Hospitalist Admission Note    NAME:  Virgie Cohen   :   1916   MRN:   [id number]     Date of admit: 2018    PCP: Jalil Goldsmith MD    Assessment/Plan:     Possible stroke (cerebrum) ([facility] [address] (2018) POA  Acute onset speech changes, right facial  Improved in ED, now sleepy after valium for agitation  Head CT with ? Left pontine infarct  Given age and improving, family deferred TPA  D/W family, no further w/u at present time, will not , no MRI or echo  Change ASA to plavix(failed aspirin)  Have speech and PT see in AM, if at baseline then D/C to home in AM  If not able to to swallow or walk, then recommended hospice    Essential HTN POA  Continue home medications  PRN labetalol    Dementia POA  Continue valium    History of right hip fracture POA    Body mass index is 25.87 kg/(m^2). Admit to inpatient appropriate due to high risk of decompensation if discharged from the ED    DVT prophylaxis with lovenox    Code status: DNR/DNI  NOK: Johnathan Alston [phone]    History     CHIEF COMPLAINT: brought in by family with slurred speech and right facial droop today    HISTORY OF PRESENT ILLNESS:  80 Y. [place] [employer]  Baseline dementia  Lives with daughter, spoke with daughters in room for history, just received valium for agitation  Requires help with ADLs, but able to communicate verbally  Walks with walker and assistance or use of a wheelchair  No prior CVA or TIA  Takes aspirin at baseline    Woke up at baseline, getting ready to move to table for lunch, suddenly stopped talking  Trying to talk but could not \"express herself\"  mouth drooping on the right  Moving arms and legs okay  Recently with illnesses   No fevers, chills, HA or CP or abdominal pain, no N/V or diarrhea or urinary symptoms   MS at baseline    ED   Slowly improved back to baseline speech  Head CT with ?  Left pontine CVA  Family deferred tPA  We were called to admit the patient    Past Medical History: Diagnosis Date    Alzheimer disease     Hypertension         Past Surgical History:   Procedure Laterality Date    HX ORTHOPAEDIC      bilateral knee replacement       Social History   Substance Use Topics    Smoking status: Never Smoker    Smokeless tobacco: Never Used    Alcohol use No        Family History   Problem Relation Age of Onset    Dementia Father         No Known Allergies     Prior to Admission medications    Medication Sig Start Date End Date Taking? Authorizing Provider   diazePAM (VALIUM) 5 mg/5 mL (1 mg/mL, 5 mL) soln oral solution Take 5 mg by mouth two (2) times daily as needed (Anxiety). Yes Historical Provider   bisoprolol-hydroCHLOROthiazide [facility]) 5-6.25 mg per tablet Take 1 Tab by mouth daily. Yes Historical Provider   MULTIVIT-MINERALS/FOLIC ACID (ADULT ONE DAILY GUMMIES PO) Take 1 Tab by mouth daily. Yes Historical Provider   ascorbic acid, vitamin C, (VITAMIN C) 500 mg tablet Take 500 mg by mouth daily. Yes Historical Provider   aspirin 81 mg chewable tablet Take 81 mg by mouth every evening.    Yes Phys Other, MD       Review of symptoms  Could not provide due to altered mental status      Objective:   VITALS:    Patient Vitals for the past 24 hrs:   Temp Pulse Resp BP SpO2   18 2315 - 65 18 - -   18 2305 - 65 17 - -   18 2245 - 64 16 - -   18 2230 - 65 18 - -   18 221 - 66 19 - -   18 220 - 65 18 - -   18 214 -  17 - -   18 -  19 (!) 170/106 95 %   18 -  19 171/81 96 %   18 1945 - 65 17 157/76 96 %   18 193 - 66 18 165/77 98 %   18 191 - 67 21 153/81 97 %   18 190 - 66 17 160/77 96 %   18 1612 98.5 °F (36.9 °C) 81 23 174/87 98 %     Temp (24hrs), Av.5 °F (36.9 °C), Min:98.5 °F (36.9 °C), Max:98.5 °F (36.9 °C)      O2 Device: Room air    PHYSICAL EXAM:   General:    Lethargic, not able to talk after valium, no distress    HEENT: Normocephalic, atraumatic    PERRL, Sclera no icterus    Nasal mucosa without masses or discharge     Oropharynx without erythema or exudate  Neck:  No meningismus, trachea midline, no carotid bruits     Thyroid not enlarged, no nodules or tenderness  Lungs:   Clear to auscultation bilaterally. No wheezing or rales    No accessory muscle use or retractions. Heart:   Regular rate and rhythm,  no murmur or gallop. No LE edema  Abdomen:   Soft, non-tender. Not distended. Bowel sounds normal.     No masses, No Hepatosplenomegaly, No Rebound or guarding  Lymph nodes: No cervical or inguinal NEGRO  Musculoskeletal:  No Joint swelling, erythema, warmth. No Cyanosis or clubbing  Skin:      No rashes     Not Jaundiced   No nodules or thickening    Capillary refill normal  Neurologic: Lethargic, moving all limbs        LAB DATA REVIEWED:    Recent Results (from the past 12 hour(s))   CBC WITH AUTOMATED DIFF    Collection Time: 02/21/18  4:33 PM   Result Value Ref Range    WBC 5.8 3.6 - 11.0 K/uL    RBC 3.19 (L) 3.80 - 5.20 M/uL    HGB 9.9 (L) 11.5 - 16.0 g/dL    HCT 31.2 (L) 35.0 - 47.0 %    MCV 97.8 80.0 - 99.0 FL    MCH 31.0 26.0 - 34.0 PG    MCHC 31.7 30.0 - 36.5 g/dL    RDW 14.6 (H) 11.5 - 14.5 %    PLATELET 141 839 - 666 K/uL    MPV 11.3 8.9 - 12.9 FL    NRBC 0.0 0  WBC    ABSOLUTE NRBC 0.00 0.00 - 0.01 K/uL    NEUTROPHILS 60 32 - 75 %    LYMPHOCYTES 29 12 - 49 %    MONOCYTES 8 5 - 13 %    EOSINOPHILS 3 0 - 7 %    BASOPHILS 0 0 - 1 %    IMMATURE GRANULOCYTES 0 0.0 - 0.5 %    ABS. NEUTROPHILS 3.5 1.8 - 8.0 K/UL    ABS. LYMPHOCYTES 1.7 0.8 - 3.5 K/UL    ABS. MONOCYTES 0.4 0.0 - 1.0 K/UL    ABS. EOSINOPHILS 0.2 0.0 - 0.4 K/UL    ABS. BASOPHILS 0.0 0.0 - 0.1 K/UL    ABS. IMM.  GRANS. 0.0 0.00 - 0.04 K/UL    DF AUTOMATED     METABOLIC PANEL, BASIC    Collection Time: 02/21/18  4:33 PM   Result Value Ref Range    Sodium 145 136 - 145 mmol/L    Potassium 4.2 3.5 - 5.1 mmol/L    Chloride 111 (H) 97 - 108 mmol/L    CO2 27 21 - 32 mmol/L    Anion gap 7 5 - 15 mmol/L    Glucose 85 65 - 100 mg/dL    BUN 23 (H) 6 - 20 MG/DL    Creatinine 1.22 (H) 0.55 - 1.02 MG/DL    BUN/Creatinine ratio 19 12 - 20      GFR est AA 49 (L) >60 ml/min/1.73m2    GFR est non-AA 40 (L) >60 ml/min/1.73m2    Calcium 8.7 8.5 - 10.1 MG/DL   CK    Collection Time: 02/21/18  4:33 PM   Result Value Ref Range    CK 80 26 - 192 U/L   TROPONIN I    Collection Time: 02/21/18  4:33 PM   Result Value Ref Range    Troponin-I, Qt. 0.13 (H) <0.05 ng/mL   PROTHROMBIN TIME + INR    Collection Time: 02/21/18  4:52 PM   Result Value Ref Range    INR 1.0 0.9 - 1.1      Prothrombin time 10.3 9.0 - 11.1 sec   PTT    Collection Time: 02/21/18  4:52 PM   Result Value Ref Range    aPTT 27.2 22.1 - 32.0 sec    aPTT, therapeutic range     58.0 - 77.0 SECS   EKG, 12 LEAD, INITIAL    Collection Time: 02/21/18  5:37 PM   Result Value Ref Range    Ventricular Rate 87 BPM    Atrial Rate 87 BPM    P-R Interval 240 ms    QRS Duration 72 ms    Q-T Interval 372 ms    QTC Calculation (Bezet) 447 ms    Calculated P Axis 45 degrees    Calculated R Axis -65 degrees    Calculated T Axis 138 degrees    Diagnosis       Sinus rhythm with 1st degree AV block  Left anterior fascicular block  Minimal voltage criteria for LVH, may be normal variant  Septal infarct , age undetermined  When compared with ECG of 27-SEP-2013 19:04,  premature atrial complexes are no longer present  Septal infarct is now present  Non-specific change in ST segment in Anterolateral leads  Nonspecific T wave abnormality, worse in Inferior leads  Inverted T waves have replaced nonspecific T wave abnormality in Lateral   leads         EKG as read by me shows NSR rate 87, LAD, no LVH  Normal intervals, no ST-T changes    CXR read by radiology and reviewed by myself shows FINDINGS:   A portable AP radiograph of the chest was obtained at 1700 hours. There is no consolidation or pulmonary edema. There is no pneumothorax or  pleural effusion.  Left lung base visualization is obscured by overlying hand and  wrist.  Cardiac size is mildly enlarged. Mild vascular calcification of the  thoracic aorta with minimal tortuosity is again shown. There is no hilar  enlargement. The bones are severely osteopenic. IMPRESSION: No acute findings. CT scan head FINDINGS:   There is moderate prominence of the ventricles and cortical sulci consistent  with atrophy. Moderately prominent bilateral cerebral periventricular diminished  attenuation is again shown consistent with chronic small vessel ischemic disease  the white matter. Chronic lacunar infarction is again shown in the left corona  radiata and lentiform nucleus. Patient motion as well as artifact arising from  bone window suboptimal assessment of the brainstem. Equivocal diminished  attenuation in the left anterior aspect of the superior iker is shown. There is  no intracranial hemorrhage, extra-axial collection, mass, mass effect or midline  shift. The basilar cisterns are open. Other than the pontine finding, there is  no unenhanced CT imaging evidence for acute infarction. . The bone windows  demonstrate no abnormalities. The visualized portions of the paranasal sinuses  and mastoid air cells are clear. IMPRESSION: Atrophy and chronic small vessel ischemic disease the white matter. Artifact versus true diminished attenuation in left anterolateral aspect of  superior iker. The possibility of acute or subacute infarction in this area is  not excluded.     I saw the patient personally, took a history and did a complete physical exam at the bedside. I performed complex decision making in coming up with a diagnostic and treatment plan for the patient. I reviewed the patient's past medical records, current laboratory and radiology results, and actual Xray films/EKG. I have also discussed this case with the involved ED physician.     Care Plan discussed with:    Patient, Family, ED Doc    Risk of deterioration:  High    Total Time Coordinating Admission: 60    minutes    Total Critical Care Time:         Kathe Castro MD

## 2018-02-22 NOTE — PROGRESS NOTES
Pt is a 80 y.o  [place] female admitted with a Stroke. Pt was resting in bed and her 2 daughters were at the bedside. Demographic information verified and all is correct and this CM will update it with an additional daughter's contact information. Pt lives with her daughter, Candice Mc in a 2 story home with a ramp. Pt stays on the first floor of the home. Prior to admission, pt needed assistance with her ADL's and IADL's. Pt receives personal care aides from [facility]. Pt receives them Monday-Friday, 9am-4pm. Pt has a walker, wc and hospital bed. Preferred pharmacy is [facility] in [place]. Denies having home health or rehab in the past. Pt's daughter can transport pt home at discharge. CM will continue to follow pt for discharge planning needs. Care Management Interventions  PCP Verified by CM: Yes (Dr. Brissa Proctor)  Mode of Transport at Discharge: Other (see comment) (pt's daughters can transport by car)  Transition of Care Consult (CM Consult): Discharge Planning  Discharge Durable Medical Equipment: No (walker, hospital bed, wc)  Physical Therapy Consult: Yes  Occupational Therapy Consult: Yes  Speech Therapy Consult: Yes  Current Support Network:  Other, Own Home (lives with daughter in a 2 story home with a ramp)  Confirm Follow Up Transport: Family  Plan discussed with Pt/Family/Caregiver: Yes  Discharge Location  Discharge Placement: [address], [address]

## 2018-02-22 NOTE — ED NOTES
Per pt family, patient has started bending her arm and trying to take off BP cuff when it is attempting to take her blood pressure. Patient family requesting for patient to be given her home dose of valium. MD José Miguel Louis notified and orders received.

## 2018-02-22 NOTE — PROGRESS NOTES
Physical Therapy Goals  Initiated 2/22/2018  1. Patient will move from supine to sit and sit to supine , scoot up and down and roll side to side in bed with total A x1 within 7 day(s). Remainder of goals will be set upon assessment. physical Therapy EVALUATION- neuro population    Patient: Soheila Castro (80 y.o. female)  Date: 2/22/2018  Primary Diagnosis: Stroke (cerebrum) [facility])        Precautions:  DNR, Fall, Bed Alarm    ASSESSMENT :  Based on the objective data described below, the patient presents with lethargy and AMS (baseline likely advanced dementia), resistance to all activity and withdrawal from pain greatly impacting functional mobility. Patient with 24/7 care at home, requiring A for all mobility with RW vs WC as detailed below with inconsistency depending on the day 2/2 dementia. Received in supine, lethargic and eyes closed, with 2 daughter's present who clarified all baseline info. Patient only awoke to significant stimuli, with withdrawal from pain at feet but not fingernails, although remained eyes closed for majority of session. No focal weakness noted as patient resistive to movement in all extremities, rolling with total A of 2 from her L to supine/R side as it was time for turn. While patient's BP initially low with MAPs of 60's, BP improved after several minutes of more upright sitting position with bed adjusted from near flat to 30 degrees. No further activity to be had given status thus remainder of mobility deferred. Patient's family report desire to take patient home regardless of workup ahead; pending care decisions will determine whether [place] therapy is appropriate. BEFAST teaching and CVA specific education deferred at this time 2/2 status; patient inappropriate 2/2 AMS for teaching with family to be instructed ahead. Patient will benefit from skilled intervention to address the above impairments.   Patients rehabilitation potential is considered to be Poor - Hospice likely appropriate  Factors which may influence rehabilitation potential include:   []           None noted  [x]           Mental ability/status  [x]           Medical condition  []           Home/family situation and support systems  []           Safety awareness  []           Pain tolerance/management  [x]           Other: low prior functioning      PLAN :  Recommendations and Planned Interventions:  [x]             Bed Mobility Training             []      Neuromuscular Re-Education  [x]             Transfer Training                   []      Orthotic/Prosthetic Training  [x]             Gait Training                         []      Modalities  [x]             Therapeutic Exercises           []      Edema Management/Control  [x]             Therapeutic Activities            [x]      Patient and Family Training/Education  []             Other (comment):  Frequency/Duration: Patient will be followed by physical therapy 3 times a week to address goals. TRIAL   Discharge Recommendations: return to home with caregiver and family support per family's request; [facility] therapies TBD pending progress   Further Equipment Recommendations for Discharge: defer at this time      SUBJECTIVE:   Patient stated oh.  Patient non-verbal for majority of session, eyes closed, only opening and stating word at very end of session    OBJECTIVE DATA SUMMARY:   HISTORY:    Past Medical History:   Diagnosis Date    Alzheimer disease     Hypertension      Past Surgical History:   Procedure Laterality Date    HX ORTHOPAEDIC      bilateral knee replacement     Prior Level of Function/Home Situation: Lives at home with 24hr care from either 2 daughters, family, or 5-2 M-F aide's who A with BADL's, dependent in IADL's. She is total A of 1 for bed mobility from hospital bed, and requires 'a lot of assistance' for short distance (either transfers, or steps in the bathroom) with RW to her WC (which she canNOT advance by self). Ramp to enter/exit household. Family cares for all medication (mostly liquids). No recent therapy. She sleeps at varying schedules, typically from 7PM-12PM   Personal factors and/or comorbidities impacting plan of care: (likely advanced) Alheimer's disease    Home Situation  Home Environment: Private residence  Wheelchair Ramp: Yes  One/Two Story Residence: One story  Living Alone: No  Support Systems: Family member(s), Home care staff (daughters; aide 9-4 )  Patient Expects to be Discharged to[de-identified] Private residence  Current DME Used/Available at Home: Megan Blackwood, Pete Mathias, [address] bed, Transfer bench, Grab bars  Tub or Shower Type: Tub/Shower combination    EXAMINATION/PRESENTATION/DECISION MAKING:   Critical Behavior:  Neurologic State: Confused, Lethargic  Orientation Level: Disoriented X4  Cognition: No command following, Memory loss  Safety/Judgement: Lack of insight into deficits  Hearing: Auditory  Auditory Impairment: None  Range Of Motion:  AROM: Grossly decreased, non-functional (resistive to ROM  )                       Strength:    Strength: Grossly decreased, non-functional (resistive to ROM  )                    Tone & Sensation:                  Sensation: Intact               Coordination: NT      Vision:  NT; family reports WNL      Functional Mobility:  Bed Mobility:  Rolling:  Total assistance;Assist x2           Functional Measure  Harman Balance Test:    Sitting to Standin  Standing Unsupported: 0  Sitting with Back Unsupported: 0  Standing to Sittin  Transfers: 0  Standing Unsupported with Eyes Closed: 0  Standing Unsupported with Feet Together: 0  Reach Forward with Outstretched Arm: 0   Object: 0  Turn to Look Over Shoulders: 0  Turn 360 Degrees: 0  Alternate Foot on Step/Stool: 0  Standing Unsupported One Foot in Front: 0  Stand on One Le  Total: 0         56=Maximum possible score;   0-20=High fall risk  21-40=Moderate fall risk   41-56=Low fall risk     Harman Balance Test and G-code impairment scale:  Percentage of Impairment CH    0%   CI    1-19% CJ    20-39% CK    40-59% CL    60-79% CM    80-99% CN     100%   Harman   Score 0-56 56 45-55 34-44 23-33 12-22 1-11 0       G codes: In compliance with CMSs Claims Based Outcome Reporting, the following G-code set was chosen for this patient based on their primary functional limitation being treated: The outcome measure chosen to determine the severity of the functional limitation was the Gaming with a score of 0/56 which was correlated with the impairment scale. ? Mobility - Walking and Moving Around:     - CURRENT STATUS: CN - 100% impaired, limited or restricted    - GOAL STATUS: CM - 80%-99% impaired, limited or restricted    - D/C STATUS:  ---------------To be determined---------------      Activity Tolerance:   Poor 2/2 lethargy, resistance to activity, with patient febrile and hypotensive     Please refer to the flowsheet for vital signs taken during this treatment. After treatment:   []     Patient left in no apparent distress sitting up in chair  [x]     Patient left in no apparent distress in bed  [x]     Call bell left within reach  [x]     Nursing notified  [x]     Caregiver present  []     Bed alarm activated    COMMUNICATION/EDUCATION:   The patients plan of care was discussed with: Occupational Therapist, Registered Nurse and Physician. All education deferred 2/2 patient's AMS     [x]  Fall prevention education was provided and the patient/caregiver indicated understanding. [x]  Patient/family have participated as able in goal setting and plan of care. [x]  Patient/family agree to work toward stated goals and plan of care. []  Patient understands intent and goals of therapy, but is neutral about his/her participation. [x]  Patient is unable to participate in goal setting and plan of care.     Thank you for this referral.  Joanie Rubi, PT, DPT, CEEAA      Time Calculation: 19 mins

## 2018-02-22 NOTE — ED NOTES
Patient resting in bed. Family remains at bedside. Will continue to monitor and assess patient needs.

## 2018-02-22 NOTE — ED NOTES
Pt placed on hospital bed, brief changed due to incontinence of urine and stool. New linen placed on bed. Lights dimmed for comfort. Pt given pillow. Daughters at bedside.

## 2018-02-22 NOTE — ED NOTES
Patient appears calmer. Patient daughters remain at bedside with patient. Will continue to monitor and assess patient needs.

## 2018-02-22 NOTE — PROGRESS NOTES
* No surgery found *  * No surgery found *  Bedside shift change report given to [address] (oncoming nurse) by Annie Eduardo (offgoing nurse). Report included the following information SBAR. Zone Phone:         Significant changes during shift:  New admit from ER Medicated in ER with IV valium and is not opening eyes are following commands since. Pulls away when examined or attempting to get VS. Family with patient. Too lethargic to be assessed by speech  PT, or OT. Patient Information    Po Hull  80 y.o.  2/21/2018  3:56 PM by Eulalia Davenport MD. Po Hull was admitted from Home    Problem List    Patient Active Problem List    Diagnosis Date Noted    Stroke (cerebrum) ([facility] [address] 02/21/2018    Hip fracture ([facility] [address] 09/27/2013    Dementia 09/27/2013    HTN (hypertension) 09/27/2013     Past Medical History:   Diagnosis Date    Alzheimer disease     Hypertension          Core Measures:    CVA: Yes Yes  CHF:No No  PNA:No No      Activity Status:    OOB to Chair No  Ambulated this shift No   Bed Rest Yes    Supplemental O2: (If Applicable)    NC Yes  NRB No  Venti-mask Not applicable  On 2 Liters/min      LINES AND DRAINS:    DVT prophylaxis:    DVT prophylaxis Med- yes  DVT prophylaxis SCD or DARRIAN- No     Wounds: (If Applicable)    Wounds- Yes    Location rt heel    Patient Safety:    Falls Score Total Score: 4  Safety Level_______  Bed Alarm On? Yes  Sitter? No    Plan for upcoming shift:  Safety. Speech and PT OT when patient more awake        Discharge Plan: Yes home when at baseline    Active Consults:  [address]

## 2018-02-22 NOTE — ED NOTES
Bedside and Verbal shift change report given to [address], RN (oncoming nurse) by Ben Padilla RN (offgoing nurse). Report included the following information SBAR, Kardex, ED Summary, MAR, Accordion and Recent Results.

## 2018-02-22 NOTE — ED NOTES
Patient resting in bed, appears to be sleeping. Patient daughters remain at bedside. Will continue to monitor and assess patient needs.

## 2018-02-22 NOTE — PROGRESS NOTES
OT referral received, chart reviewed and cleared by nursing to see patient for evaluation. Family present to provide prior level of function information. Per family patient was total A for bed mobility, dressing, bathing, toileting and for a majority of her ADLs. Patient could feed herself and wash her face with supervision/setup, transferred to [facility] and ambulated a few feet into bathroom with a RW with mod A, and was total A to mobilize in [facility] from room to room. In regards to her speech she is dysarthric and unintelligible at times and cognitively she is only oriented to person, demonstrate limited command following, and only intermittently recognizes family members at baseline. Patient has a daughter who assists her and has caregivers Monday through Friday 9-4. Upon attempting to evaluate patient for OT needs, patient unable to awaken in response to maximum stimuli. LE did withdraw to painful stimuli and patient equally resistant to ROM of all extremities. Patient unable to participate OT evaluation and evaluation was aborted. Patient likely not in need of OT services due to her limited ADL baseline, but will assess patient's ability to feed herself if she becomes more alert and able to participate.

## 2018-02-22 NOTE — ED NOTES
Patient medicated per MD orders. Patient agitated by having so many wires and pulling things off. Patient BP cuff and pulse ox removed temporarily to allow patient to relax. Family remains at bedside. Will continue to monitor and assess patient needs.

## 2018-02-22 NOTE — PROGRESS NOTES
Problem: TIA/CVA Stroke: Day 2 Until Discharge  Goal: *Tolerating diet  Outcome: Not Progressing Towards Goal  Too lethargic

## 2018-02-22 NOTE — CONSULTS
DATE OF CONSULTATION: 2/22/2018    CONSULTED BY: Brynda Fleischer, MD    Chief Complaint   Patient presents with    Facial Droop       Reason for Consult  I have been asked to see the patient in neurological consultation to render advice and opinion regarding slurred speech and lethargy    HISTORY OF PRESENT ILLNESS  Kathlyn Schilder is a 80 y.o. female who presents to the hospital because of onset this am of slurred speech. She was sedated with Valium for scan and is lethargic now.  She is cared for at home by family    ROS  Not obtainable    PM  Past Medical History:   Diagnosis Date    Alzheimer disease     Hypertension        FH  Family History   Problem Relation Age of Onset    Dementia Father        31 [address]  Social History     Social History    Marital status:      Spouse name: N/A    Number of children: N/A    Years of education: N/A     Social History Main Topics    Smoking status: Never Smoker    Smokeless tobacco: Never Used    Alcohol use No    Drug use: No    Sexual activity: Not Asked     Other Topics Concern    None     Social History Narrative       ALLERGIES  No Known Allergies    PHYSICAL EXAM  EXAMINATION:   Patient Vitals for the past 24 hrs:   Temp Pulse Resp BP SpO2   02/22/18 0946 (!) 100.9 °F (38.3 °C) - - - -   02/22/18 0649 - 79 29 - 94 %   02/22/18 0633 - - - - (!) 88 %   02/22/18 0630 - 77 29 122/58 95 %   02/22/18 0545 - 76 27 132/67 96 %   02/22/18 0500 - 76 22 149/71 96 %   02/22/18 0446 - 75 23 - 96 %   02/22/18 0328 - 85 25 - 95 %   02/22/18 0244 - 85 18 - 91 %   02/22/18 0130 - 66 19 120/76 95 %   02/22/18 0112 - 63 18 - 96 %   02/22/18 0010 - 64 19 - 96 %   02/21/18 2355 - 63 15 (!) 125/106 -   02/21/18 2315 - 65 18 - -   02/21/18 2305 - 65 17 - -   02/21/18 2245 - 64 16 - -   02/21/18 2230 - 65 18 - -   02/21/18 2215 - 66 19 - -   02/21/18 2200 - 65 18 - -   02/21/18 2145 - 66 17 - -   02/21/18 2045 - 67 19 (!) 170/106 95 %   02/21/18 2000 - 73 19 171/81 96 % 02/21/18 1945 - 65 17 157/76 96 %   02/21/18 1930 - 66 18 165/77 98 %   02/21/18 1915 - 67 21 153/81 97 %   02/21/18 1900 - 66 17 160/77 96 %   02/21/18 1612 98.5 °F (36.9 °C) 81 23 174/87 98 %        General:   Physical Exam   CONSTITUTIONAL: Will moan to sternal rub   Head: Normocephalic and atraumatic. Neurological Examination:   Mental Status: Will moan to sternal rub      Cranial Nerves: PERRL, face symmetric  Motor: Withdraws all extremities to pain symmetrically. Sensation: Withdraws all extremities to pain symmetrically    Reflexes: DTRs 2+ throughout. Plantar responses downgoing. Coordination/Cerebellar:NT    Gait: NT      LAB DATA REVIEWED:    Results for orders placed or performed during the hospital encounter of 02/21/18   CBC WITH AUTOMATED DIFF   Result Value Ref Range    WBC 5.8 3.6 - 11.0 K/uL    RBC 3.19 (L) 3.80 - 5.20 M/uL    HGB 9.9 (L) 11.5 - 16.0 g/dL    HCT 31.2 (L) 35.0 - 47.0 %    MCV 97.8 80.0 - 99.0 FL    MCH 31.0 26.0 - 34.0 PG    MCHC 31.7 30.0 - 36.5 g/dL    RDW 14.6 (H) 11.5 - 14.5 %    PLATELET 484 516 - 114 K/uL    MPV 11.3 8.9 - 12.9 FL    NRBC 0.0 0  WBC    ABSOLUTE NRBC 0.00 0.00 - 0.01 K/uL    NEUTROPHILS 60 32 - 75 %    LYMPHOCYTES 29 12 - 49 %    MONOCYTES 8 5 - 13 %    EOSINOPHILS 3 0 - 7 %    BASOPHILS 0 0 - 1 %    IMMATURE GRANULOCYTES 0 0.0 - 0.5 %    ABS. NEUTROPHILS 3.5 1.8 - 8.0 K/UL    ABS. LYMPHOCYTES 1.7 0.8 - 3.5 K/UL    ABS. MONOCYTES 0.4 0.0 - 1.0 K/UL    ABS. EOSINOPHILS 0.2 0.0 - 0.4 K/UL    ABS. BASOPHILS 0.0 0.0 - 0.1 K/UL    ABS. IMM.  GRANS. 0.0 0.00 - 0.04 K/UL    DF AUTOMATED     METABOLIC PANEL, BASIC   Result Value Ref Range    Sodium 145 136 - 145 mmol/L    Potassium 4.2 3.5 - 5.1 mmol/L    Chloride 111 (H) 97 - 108 mmol/L    CO2 27 21 - 32 mmol/L    Anion gap 7 5 - 15 mmol/L    Glucose 85 65 - 100 mg/dL    BUN 23 (H) 6 - 20 MG/DL    Creatinine 1.22 (H) 0.55 - 1.02 MG/DL    BUN/Creatinine ratio 19 12 - 20      GFR est AA 49 (L) >60 ml/min/1.73m2    GFR est non-AA 40 (L) >60 ml/min/1.73m2    Calcium 8.7 8.5 - 10.1 MG/DL   CK   Result Value Ref Range    CK 80 26 - 192 U/L   TROPONIN I   Result Value Ref Range    Troponin-I, Qt. 0.13 (H) <0.05 ng/mL   PROTHROMBIN TIME + INR   Result Value Ref Range    INR 1.0 0.9 - 1.1      Prothrombin time 10.3 9.0 - 11.1 sec   PTT   Result Value Ref Range    aPTT 27.2 22.1 - 32.0 sec    aPTT, therapeutic range     58.0 - 77.0 SECS   EKG, 12 LEAD, INITIAL   Result Value Ref Range    Ventricular Rate 87 BPM    Atrial Rate 87 BPM    P-R Interval 240 ms    QRS Duration 72 ms    Q-T Interval 372 ms    QTC Calculation (Bezet) 447 ms    Calculated P Axis 45 degrees    Calculated R Axis -65 degrees    Calculated T Axis 138 degrees    Diagnosis       Sinus rhythm with 1st degree AV block  Left anterior fascicular block  Minimal voltage criteria for LVH, may be normal variant  When compared with ECG of 27-SEP-2013 19:04,  premature atrial complexes are no longer present  Non-specific change in ST segment in Anterolateral leads  Nonspecific T wave abnormality, worse in Inferior leads  Confirmed by Maureen Rick P.VDanna ([id number]) on 2/22/2018 9:17:59 AM          Imaging review:  CT HEAD W/O 2/22/18     IMPRESSION  IMPRESSION: Atrophy and chronic small vessel ischemic disease the white matter. Artifact versus true diminished attenuation in left anterolateral aspect of  superior iker. The possibility of acute or subacute infarction in this area is  not excluded.     HOME MEDS  Prior to Admission Medications   Prescriptions Last Dose Informant Patient Reported? Taking? MULTIVIT-MINERALS/FOLIC ACID (ADULT ONE DAILY GUMMIES PO) 2/21/2018 at Unknown time Child Yes Yes   Sig: Take 1 Tab by mouth daily. ascorbic acid, vitamin C, (VITAMIN C) 500 mg tablet 2/21/2018 at Unknown time Child Yes Yes   Sig: Take 500 mg by mouth daily.    aspirin 81 mg chewable tablet 2/20/2018 at Unknown time Child Yes Yes   Sig: Take 81 mg by mouth every evening. bisoprolol-hydroCHLOROthiazide [facility]) 5-6.25 mg per tablet 2/21/2018 at Unknown time Child Yes Yes   Sig: Take 1 Tab by mouth daily. diazePAM (VALIUM) 5 mg/5 mL (1 mg/mL, 5 mL) soln oral solution 2/21/2018 at Unknown time Child Yes Yes   Sig: Take 5 mg by mouth two (2) times daily as needed (Anxiety). Facility-Administered Medications: None       CURRENT MEDS  Current Facility-Administered Medications   Medication Dose Route Frequency    0.9% sodium chloride infusion  50 mL/hr IntraVENous CONTINUOUS    sodium chloride (NS) flush 5-10 mL  5-10 mL IntraVENous Q8H    clopidogrel (PLAVIX) tablet 75 mg  75 mg Oral DAILY    ascorbic acid (vitamin C) (VITAMIN C) tablet 500 mg  500 mg Oral DAILY    bisoprolol-hydroCHLOROthiazide (ZIAC) 5-6.25 mg per tablet 1 Tab  1 Tab Oral DAILY    diazePAM (VALIUM) tablet 5 mg  5 mg Oral BID    heparin (porcine) injection 5,000 Units  5,000 Units SubCUTAneous Q8H       IMPRESSION:  RECOMMENDATIONS:  Possible left pontine stroke, agree with change to Plavix, home as soon as possible, no stroke W/U per family  as we are not going to do anything about what we might find. Jd Moya. Renetta Colorado MD  Neurologist    This note will not be viewable in [address]

## 2018-02-22 NOTE — ED NOTES
Pts daughters called out stating pt was having a hard time breathing and was shaking. Daughter states Nathaniel Betancur never has had the shakes like this before when she was anxious, I dont think that's the problem. \" pt sat up in the bed and placed on 3liters NC. Pt agitated and pushing family members and nursing staff aware. Lung sounds clear but audible wheezing heard coming from pts throat area. Dr. Long Marking at bedside assessing pt.

## 2018-02-22 NOTE — ED NOTES
Bedside, verbal report given to [facility]. Patient resting in bed, daughters at bedside.

## 2018-02-22 NOTE — PROGRESS NOTES
Speech pathology  Orders received, chart reviewed and spoke with both OT and RN. Patient lethargic and not appropriate for PO consideration at this time. SLP will follow up for eval once alertness allows.  Thanks, Ruba Church M.S. CCC-SLP

## 2018-02-22 NOTE — PROGRESS NOTES
Received  patient from ER via stretcher. Does not open eyes or follow commands but pulls back when touched or trying to get VSS. Family with patient . Bed alarm on. telemetry applied Oriented family to room.

## 2018-02-22 NOTE — PROGRESS NOTES
Hospitalist Progress Note          Fahad Tiwari MD  Call physician on-call through the  7pm-7am    Daily Progress Note: 2/22/2018    Admission summary:   8 year old female with history of Alzheimer's dementia, prior CVA, HTN, prior right hip fracture presented on 2/21/18 with slurred speech and right facial droop. She was diagnosed with possible CVA. Assessment/Plan:   1. Slurred speech, right facial droop, concerning for possible left pontine CVA   - family had declined tPA on admission   - CT head 2/21 - Atrophy and chronic small vessel ischemic disease the white matter. Artifact versus true diminished attenuation in left anterolateral aspect of superior iker. The possibility of acute or subacute infarction in this area is not excluded. - she had previously been on aspirin, seemingly failed therapy with this, changed to clopidogrel   - with her advanced age, unclear if there is any benefit from statin   - seen by neurology   - PT/OT/SLP evaluation - if she does not make significant recovery / unable to swallow, hospice is probably appropriate    2. Alzheimer's dementia   - supportive care    3. HTN   - resume patient's home [facility] as able, would allow some degree of permissive hypertension in setting of suspected CVA    Diet: NPO x meds, awaiting speech evaluation  DVT ppx: heparin  DNR  Dispo: still requires inpatient care at this point in time, waiting to see if she is able to pass swallow evaluation, therapy needs; if not making much progress, suggested hospice       Subjective:    She had received Ativan earlier in the morning when I had seen her and was fairly drowsy. She was not able to provide any meaningful history for me. Her daughters were present at bedside and provided much of the same history as outlined in H&P. Review of Systems:   Review of systems not obtained due to patient factors.     Objective:   Physical Exam:     Visit Vitals    /71  Pulse 64    Temp 99 °F (37.2 °C)    Resp 20    Ht 5' 1\" (1.549 m)    Wt 62.1 kg (136 lb 14.5 oz)    SpO2 95%    BMI 25.87 kg/m2    O2 Flow Rate (L/min): 3 l/min O2 Device: Nasal cannula    Temp (24hrs), Av.6 °F (37.6 °C), Min:98.5 °F (36.9 °C), Max:100.9 °F (38.3 °C)             General:  Drowsy, stirs briefly to voice and touch, but not maintaining wakefulness, she had received Ativan before I had seen her   Lungs:   Clear to auscultation bilaterally. Chest wall:  No tenderness or deformity. Heart:  Regular rate and rhythm, S1, S2 normal, no murmur, click, rub or gallop. Abdomen:   Soft, non-tender. Bowel sounds normal. No masses,  No organomegaly. Extremities: Extremities normal, atraumatic, no cyanosis or edema. Pulses: 2+ and symmetric all extremities. Skin: Skin color, texture, turgor normal. No rashes or lesions   Neurologic: Unable to fully assess as outlined above     Data Review:       Recent Days:  Recent Labs      18   1633   WBC  5.8   HGB  9.9*   HCT  31.2*   PLT  163     Recent Labs      18   1652  18   1633   NA   --   145   K   --   4.2   CL   --   111*   CO2   --   27   GLU   --   85   BUN   --   23*   CREA   --   1.22*   CA   --   8.7   INR  1.0   --      No results for input(s): PH, PCO2, PO2, HCO3, FIO2 in the last 72 hours.     24 Hour Results:  Recent Results (from the past 24 hour(s))   CBC WITH AUTOMATED DIFF    Collection Time: 18  4:33 PM   Result Value Ref Range    WBC 5.8 3.6 - 11.0 K/uL    RBC 3.19 (L) 3.80 - 5.20 M/uL    HGB 9.9 (L) 11.5 - 16.0 g/dL    HCT 31.2 (L) 35.0 - 47.0 %    MCV 97.8 80.0 - 99.0 FL    MCH 31.0 26.0 - 34.0 PG    MCHC 31.7 30.0 - 36.5 g/dL    RDW 14.6 (H) 11.5 - 14.5 %    PLATELET 566 228 - 973 K/uL    MPV 11.3 8.9 - 12.9 FL    NRBC 0.0 0  WBC    ABSOLUTE NRBC 0.00 0.00 - 0.01 K/uL    NEUTROPHILS 60 32 - 75 %    LYMPHOCYTES 29 12 - 49 %    MONOCYTES 8 5 - 13 %    EOSINOPHILS 3 0 - 7 %    BASOPHILS 0 0 - 1 % IMMATURE GRANULOCYTES 0 0.0 - 0.5 %    ABS. NEUTROPHILS 3.5 1.8 - 8.0 K/UL    ABS. LYMPHOCYTES 1.7 0.8 - 3.5 K/UL    ABS. MONOCYTES 0.4 0.0 - 1.0 K/UL    ABS. EOSINOPHILS 0.2 0.0 - 0.4 K/UL    ABS. BASOPHILS 0.0 0.0 - 0.1 K/UL    ABS. IMM.  GRANS. 0.0 0.00 - 0.04 K/UL    DF AUTOMATED     METABOLIC PANEL, BASIC    Collection Time: 02/21/18  4:33 PM   Result Value Ref Range    Sodium 145 136 - 145 mmol/L    Potassium 4.2 3.5 - 5.1 mmol/L    Chloride 111 (H) 97 - 108 mmol/L    CO2 27 21 - 32 mmol/L    Anion gap 7 5 - 15 mmol/L    Glucose 85 65 - 100 mg/dL    BUN 23 (H) 6 - 20 MG/DL    Creatinine 1.22 (H) 0.55 - 1.02 MG/DL    BUN/Creatinine ratio 19 12 - 20      GFR est AA 49 (L) >60 ml/min/1.73m2    GFR est non-AA 40 (L) >60 ml/min/1.73m2    Calcium 8.7 8.5 - 10.1 MG/DL   CK    Collection Time: 02/21/18  4:33 PM   Result Value Ref Range    CK 80 26 - 192 U/L   TROPONIN I    Collection Time: 02/21/18  4:33 PM   Result Value Ref Range    Troponin-I, Qt. 0.13 (H) <0.05 ng/mL   PROTHROMBIN TIME + INR    Collection Time: 02/21/18  4:52 PM   Result Value Ref Range    INR 1.0 0.9 - 1.1      Prothrombin time 10.3 9.0 - 11.1 sec   PTT    Collection Time: 02/21/18  4:52 PM   Result Value Ref Range    aPTT 27.2 22.1 - 32.0 sec    aPTT, therapeutic range     58.0 - 77.0 SECS   EKG, 12 LEAD, INITIAL    Collection Time: 02/21/18  5:37 PM   Result Value Ref Range    Ventricular Rate 87 BPM    Atrial Rate 87 BPM    P-R Interval 240 ms    QRS Duration 72 ms    Q-T Interval 372 ms    QTC Calculation (Bezet) 447 ms    Calculated P Axis 45 degrees    Calculated R Axis -65 degrees    Calculated T Axis 138 degrees    Diagnosis       Sinus rhythm with 1st degree AV block  Left anterior fascicular block  Minimal voltage criteria for LVH, may be normal variant  When compared with ECG of 27-SEP-2013 19:04,  premature atrial complexes are no longer present  Non-specific change in ST segment in Anterolateral leads  Nonspecific T wave abnormality, worse in Inferior leads  Confirmed by RAMON Buckley ([id number]) on 2/22/2018 9:17:59 AM         Problem List:  Problem List as of 2/22/2018  Date Reviewed: 10/1/2013          Codes Class Noted - Resolved    Stroke (cerebrum) ([facility] ICD-10-CM: I63.9  ICD-9-CM: 434.91  2/21/2018 - Present        Hip fracture ([facility] ICD-10-CM: S72.009A  ICD-9-CM: 820.8  9/27/2013 - Present        Dementia ICD-10-CM: F03.90  ICD-9-CM: 294.20  9/27/2013 - Present        HTN (hypertension) ICD-10-CM: I10  ICD-9-CM: 401.9  9/27/2013 - Present              Medications reviewed  Current Facility-Administered Medications   Medication Dose Route Frequency    0.9% sodium chloride infusion  50 mL/hr IntraVENous CONTINUOUS    sodium chloride (NS) flush 5-10 mL  5-10 mL IntraVENous Q8H    sodium chloride (NS) flush 5-10 mL  5-10 mL IntraVENous PRN    ondansetron (ZOFRAN) injection 4 mg  4 mg IntraVENous Q6H PRN    labetalol (NORMODYNE;TRANDATE) injection 5 mg  5 mg IntraVENous Q10MIN PRN    acetaminophen (TYLENOL) tablet 650 mg  650 mg Oral Q4H PRN    Or    acetaminophen (TYLENOL) solution 650 mg  650 mg Per NG tube Q4H PRN    Or    acetaminophen (TYLENOL) suppository 650 mg  650 mg Rectal Q4H PRN    clopidogrel (PLAVIX) tablet 75 mg  75 mg Oral DAILY    ascorbic acid (vitamin C) (VITAMIN C) tablet 500 mg  500 mg Oral DAILY    bisoprolol-hydroCHLOROthiazide (ZIAC) 5-6.25 mg per tablet 1 Tab  1 Tab Oral DAILY    diazePAM (VALIUM) tablet 5 mg  5 mg Oral BID    heparin (porcine) injection 5,000 Units  5,000 Units SubCUTAneous Q8H       Care Plan discussed with: Patient/Family and Nurse    Total time spent with patient: 30 minutes.     Arturo Maya MD  2/22/2018

## 2018-02-23 VITALS
RESPIRATION RATE: 18 BRPM | OXYGEN SATURATION: 93 % | HEIGHT: 61 IN | TEMPERATURE: 97.7 F | SYSTOLIC BLOOD PRESSURE: 138 MMHG | HEART RATE: 62 BPM | BODY MASS INDEX: 25.85 KG/M2 | DIASTOLIC BLOOD PRESSURE: 61 MMHG | WEIGHT: 136.91 LBS

## 2018-02-23 LAB
ANION GAP SERPL CALC-SCNC: 7 MMOL/L (ref 5–15)
BASOPHILS # BLD: 0 K/UL (ref 0–0.1)
BASOPHILS NFR BLD: 0 % (ref 0–1)
BUN SERPL-MCNC: 25 MG/DL (ref 6–20)
BUN/CREAT SERPL: 21 (ref 12–20)
CALCIUM SERPL-MCNC: 8.3 MG/DL (ref 8.5–10.1)
CHLORIDE SERPL-SCNC: 114 MMOL/L (ref 97–108)
CO2 SERPL-SCNC: 23 MMOL/L (ref 21–32)
CREAT SERPL-MCNC: 1.2 MG/DL (ref 0.55–1.02)
DIFFERENTIAL METHOD BLD: ABNORMAL
EOSINOPHIL # BLD: 0.1 K/UL (ref 0–0.4)
EOSINOPHIL NFR BLD: 1 % (ref 0–7)
ERYTHROCYTE [DISTWIDTH] IN BLOOD BY AUTOMATED COUNT: 14.7 % (ref 11.5–14.5)
GLUCOSE SERPL-MCNC: 70 MG/DL (ref 65–100)
HCT VFR BLD AUTO: 27.7 % (ref 35–47)
HGB BLD-MCNC: 8.8 G/DL (ref 11.5–16)
IMM GRANULOCYTES # BLD: 0 K/UL (ref 0–0.04)
IMM GRANULOCYTES NFR BLD AUTO: 0 % (ref 0–0.5)
LYMPHOCYTES # BLD: 1.6 K/UL (ref 0.8–3.5)
LYMPHOCYTES NFR BLD: 13 % (ref 12–49)
MCH RBC QN AUTO: 31.5 PG (ref 26–34)
MCHC RBC AUTO-ENTMCNC: 31.8 G/DL (ref 30–36.5)
MCV RBC AUTO: 99.3 FL (ref 80–99)
MONOCYTES # BLD: 0.5 K/UL (ref 0–1)
MONOCYTES NFR BLD: 4 % (ref 5–13)
NEUTS SEG # BLD: 10.2 K/UL (ref 1.8–8)
NEUTS SEG NFR BLD: 82 % (ref 32–75)
NRBC # BLD: 0 K/UL (ref 0–0.01)
NRBC BLD-RTO: 0 PER 100 WBC
PLATELET # BLD AUTO: 114 K/UL (ref 150–400)
POTASSIUM SERPL-SCNC: 4.1 MMOL/L (ref 3.5–5.1)
RBC # BLD AUTO: 2.79 M/UL (ref 3.8–5.2)
RBC MORPH BLD: ABNORMAL
SODIUM SERPL-SCNC: 144 MMOL/L (ref 136–145)
WBC # BLD AUTO: 12.4 K/UL (ref 3.6–11)

## 2018-02-23 PROCEDURE — 80048 BASIC METABOLIC PNL TOTAL CA: CPT | Performed by: STUDENT IN AN ORGANIZED HEALTH CARE EDUCATION/TRAINING PROGRAM

## 2018-02-23 PROCEDURE — 97535 SELF CARE MNGMENT TRAINING: CPT | Performed by: OCCUPATIONAL THERAPIST

## 2018-02-23 PROCEDURE — 36415 COLL VENOUS BLD VENIPUNCTURE: CPT | Performed by: STUDENT IN AN ORGANIZED HEALTH CARE EDUCATION/TRAINING PROGRAM

## 2018-02-23 PROCEDURE — 92610 EVALUATE SWALLOWING FUNCTION: CPT

## 2018-02-23 PROCEDURE — 97166 OT EVAL MOD COMPLEX 45 MIN: CPT | Performed by: OCCUPATIONAL THERAPIST

## 2018-02-23 PROCEDURE — 74011250636 HC RX REV CODE- 250/636: Performed by: INTERNAL MEDICINE

## 2018-02-23 PROCEDURE — 74011250637 HC RX REV CODE- 250/637: Performed by: INTERNAL MEDICINE

## 2018-02-23 PROCEDURE — G8989 SELF CARE D/C STATUS: HCPCS | Performed by: OCCUPATIONAL THERAPIST

## 2018-02-23 PROCEDURE — G8988 SELF CARE GOAL STATUS: HCPCS | Performed by: OCCUPATIONAL THERAPIST

## 2018-02-23 PROCEDURE — 97530 THERAPEUTIC ACTIVITIES: CPT

## 2018-02-23 PROCEDURE — 85025 COMPLETE CBC W/AUTO DIFF WBC: CPT | Performed by: STUDENT IN AN ORGANIZED HEALTH CARE EDUCATION/TRAINING PROGRAM

## 2018-02-23 PROCEDURE — G8987 SELF CARE CURRENT STATUS: HCPCS | Performed by: OCCUPATIONAL THERAPIST

## 2018-02-23 RX ORDER — CLOPIDOGREL BISULFATE 75 MG/1
75 TABLET ORAL DAILY
Qty: 30 TAB | Refills: 0 | Status: SHIPPED | OUTPATIENT
Start: 2018-02-23

## 2018-02-23 RX ADMIN — DIAZEPAM 5 MG: 5 TABLET ORAL at 09:37

## 2018-02-23 RX ADMIN — BISOPROLOL FUMARATE AND HYDROCHLOROTHIAZIDE 1 TABLET: 6.25; 5 TABLET ORAL at 09:37

## 2018-02-23 RX ADMIN — HEPARIN SODIUM 5000 UNITS: 5000 INJECTION, SOLUTION INTRAVENOUS; SUBCUTANEOUS at 08:51

## 2018-02-23 RX ADMIN — CLOPIDOGREL BISULFATE 75 MG: 75 TABLET ORAL at 09:37

## 2018-02-23 RX ADMIN — OXYCODONE HYDROCHLORIDE AND ACETAMINOPHEN 500 MG: 500 TABLET ORAL at 09:37

## 2018-02-23 NOTE — PROGRESS NOTES
Problem: Dysphagia (Adult)  Goal: *Acute Goals and Plan of Care (Insert Text)  2/23/2018  Speech path goals:  1. Pt will tolerate dys 3 diet with thins with no overt s/s of aspiration. Speech LAnguage Pathology bedside swallow evaluation  Patient: Cristian Agarwal (89 y.o. female)  Date: 2/23/2018  Primary Diagnosis: Stroke (cerebrum) [facility])        Precautions:   DNR, Fall, Bed Alarm    ASSESSMENT :  Based on the objective data described below, the patient presents with mild oropharyngeal dysphagia. Orally she is slow to masticate and clear her oral cavity of the solids. Used the straw effectively to extract liquid. With mild swallow delay and mildly reduced hyolaryngeal excursion noted. No coughing noted. Her vocal quality was grossly wnl. She may need soft textures. She lives with family who can make accommodations to her diet. Her speech was mildly dysarthric but family reports her speech to be baseline for her. Patient will benefit from skilled intervention to address the above impairments. Patients rehabilitation potential is considered to be Fair  Factors which may influence rehabilitation potential include:   []            None noted  [x]            Mental ability/status  [x]            Medical condition  [x]            Home/family situation and support systems  [x]            Safety awareness  []            Pain tolerance/management  []            Other:      PLAN :  Recommendations and Planned Interventions:  dys 3/mech soft diet  Frequency/Duration: Patient will be followed by speech-language pathology 3 times a week to address goals. Discharge Recommendations: None     SUBJECTIVE:   Patient did not want to eat or drink much even with encouragement of her dtr.  Very feisty 80 yr old who is described as independent    OBJECTIVE:     Past Medical History:   Diagnosis Date    Alzheimer disease     Hypertension      Past Surgical History:   Procedure Laterality Date    HX ORTHOPAEDIC      bilateral knee replacement     Prior Level of Function/Home Situation:   Home Situation  Home Environment: Private residence  Wheelchair Ramp: Yes  One/Two Story Residence: Two story, live on 1st floor  Living Alone: No  Support Systems: Child(ton)  Patient Expects to be Discharged to[de-identified] Private residence  Current DME Used/Available at Home: Trudell Piles, Wheelchair  Tub or Shower Type: Tub/Shower combination  Diet prior to admission: reg/thins per dtr  Current Diet:  NPO   Cognitive and Communication Status:  Neurologic State: Alert  Orientation Level: Unable to verbalize  Cognition: Unable to assess (comment)        Safety/Judgement: Lack of insight into deficits  Oral Assessment:  Oral Assessment  Labial: No impairment  Dentition: Edentulous; Upper & lower dentures  Lingual: Other (comment)  Mandible: No impairment  P.O. Trials:  Patient Position: upright in bed  Vocal quality prior to P.O.: No impairment  Consistency Presented: Thin liquid; Solid;Puree  How Presented: Self-fed/presented;Straw     Bolus Acceptance: No impairment  Bolus Formation/Control: Impaired  Type of Impairment: Delayed  Propulsion: Delayed (# of seconds)  Oral Residue: None  Initiation of Swallow: Delayed (# of seconds)  Laryngeal Elevation: Decreased  Aspiration Signs/Symptoms: None  Pharyngeal Phase Characteristics: Double swallow             Oral Phase Severity: Mild-moderate  Pharyngeal Phase Severity : Mild    NOMS:   The NOMS functional outcome measure was used to quantify this patient's level of swallowing impairment. Based on the NOMS, the patient was determined to be at level 5 for swallow function     G Codes: In compliance with CMSs Claims Based Outcome Reporting, the following G-code set was chosen for this patient based the use of the NOMS functional outcome to quantify this patient's level of swallowing impairment.     Using the NOMS, the patient was determined to be at level 5 for swallow function which correlates with the CJ= 20-39% level of severity. Based on the objective assessment provided within this note, the current, goal, and discharge g-codes are as follows:    Swallow  Swallowing:   Swallow Current Status CJ= 20-39%   Swallow Goal Status CJ= 20-39%      NOMS Swallowing Levels:  Level 1 (CN): NPO  Level 2 (CM): NPO but takes consistency in therapy  Level 3 (CL): Takes less than 50% of nutrition p.o. and continues with nonoral feedings; and/or safe with mod cues; and/or max diet restriction  Level 4 (CK): Safe swallow but needs mod cues; and/or mod diet restriction; and/or still requires some nonoral feeding/supplements  Level 5 (CJ): Safe swallow with min diet restriction; and/or needs min cues  Level 6 (CI): Independent with p.o.; rare cues; usually self cues; may need to avoid some foods or needs extra time  Level 7 ([address] [place] [address]): Independent for all p.o.  ELDER. (2003). National Outcomes Measurement System (NOMS): Adult Speech-Language Pathology User's Guide. Pain:  Pain Scale 1: Numeric (0 - 10)  Pain Intensity 1: 0     After treatment:   []            Patient left in no apparent distress sitting up in chair  [x]            Patient left in no apparent distress in bed  [x]            Call bell left within reach  [x]            Nursing notified  [x]            Caregiver present  []            Bed alarm activated    COMMUNICATION/EDUCATION:   The patients plan of care including recommendations, planned interventions, and recommended diet changes were discussed with: Registered Nurse. Patient was educated regarding Her deficit(s) of dysphagia as this relates to Her diagnosis of CVA. She demonstrated Guarded understanding due to her dementia. []            Posted safety precautions in patient's room. [x]            Patient/family have participated as able in goal setting and plan of care. []            Patient/family agree to work toward stated goals and plan of care.   []            Patient understands intent and goals of therapy, but is neutral about his/her participation. []            Patient is unable to participate in goal setting and plan of care.     Thank you for this referral.  IRLANDA Isaacs  Time Calculation: 12 mins

## 2018-02-23 NOTE — PROGRESS NOTES
CM met with pt and pt's daughter and discussed home health and provided a list. Pt's daughter was in agreement. Pt's daughter chose [facility] [place] and [facility] form completed and referral sent via [facility]. 10:30am - received update from Carliss Meigs, liaison with [facility] and they don't have the staff to provide home health to pt. LOUIE met with pt's daughter and she chose [facility] form completed and referral sent via [employer].      Mendez Heath, [id number]

## 2018-02-23 NOTE — PROGRESS NOTES
Pt's daughter transporting pt home by car. Pt's daughter refused medical transport, she has a wc in the car and will have help at home. Home health set up with [facility] since [address] [place] [address] could not service the location. F/u appointments made and documented on the discharge paperwork. Care Management Interventions  PCP Verified by CM: Yes (Dr. Rene Evans)  Mode of Transport at Discharge: Other (see comment) (pt's daughter will transport by car)  Hospital Transport Time of Discharge: Jet (CM Consult): Discharge Planning, [address] ([facility])  [address]: No  Reason Outside [place]: Out of service area ([facility])  Discharge Durable Medical Equipment: No  Physical Therapy Consult: Yes  Occupational Therapy Consult: Yes  Speech Therapy Consult: Yes  Current Support Network:  Other, Own Home (lives with daughter in a 2 story home with a ramp)  Confirm Follow Up Transport: Family  Plan discussed with Pt/Family/Caregiver: Yes  Freedom of Choice Offered: Yes  Discharge Location  Discharge Placement: Home with home health    Candice Salas, [address]

## 2018-02-23 NOTE — DISCHARGE INSTRUCTIONS
Discharge Instructions       PATIENT ID: Cristian Agarwal  MRN: [id number]   YOB: 1916    DATE OF ADMISSION: 2/21/2018  3:56 PM    DATE OF DISCHARGE: 2/23/2018    PRIMARY CARE PROVIDER: oKrey Watkins MD     ATTENDING PHYSICIAN: Mary Shepherd MD  DISCHARGING PROVIDER: Mary Shepherd MD    To contact this individual call [phone] and ask the  to page. If unavailable ask to be transferred the Adult Hospitalist Department. 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 FOLLOW UP APPOINTMENTS:   Follow-up Information     Follow up With Details Comments Contact Cody Veronica MD Schedule an appointment as soon as possible for a visit in 1 week  [address]  [phone]             ADDITIONAL CARE RECOMMENDATIONS:     Stop aspirin, you were started on a new medication for stroke called Plavix. The biggest side effect or risk is GI bleeds. If you notice dark stool or bleeding, please either call your primary care provider or come to the hospital for further evaluation. DIET: Dysphagia 3, mechanical soft, thin liquids    ACTIVITY: PT/OT per Home Health    WOUND CARE: N/A    EQUIPMENT needed: N/A      DISCHARGE MEDICATIONS:   See Medication Reconciliation Form    · It is important that you take the medication exactly as they are prescribed. · Keep your medication in the bottles provided by the pharmacist and keep a list of the medication names, dosages, and times to be taken in your wallet. · Do not take other medications without consulting your doctor. NOTIFY YOUR PHYSICIAN FOR ANY OF THE FOLLOWING:   Fever over 101 degrees for 24 hours.    Chest pain, shortness of breath, fever, chills, nausea, vomiting, diarrhea, change in mentation, falling, weakness, bleeding. Severe pain or pain not relieved by medications. Or, any other signs or symptoms that you may have questions about.       DISPOSITION:  x Home With:   OT x PT x HH  RN       SNF/Inpatient Rehab/LTAC    Independent/assisted living    Hospice    Other:     CDMP Checked:   Yes x     PROBLEM LIST Updated:  Yes x       Signed:   Gracie La MD  2/23/2018  9:26 AM

## 2018-02-23 NOTE — PROGRESS NOTES
Problem: Mobility Impaired (Adult and Pediatric)  Goal: *Acute Goals and Plan of Care (Insert Text)  Physical Therapy Goals  Initiated 2/22/2018  1. Patient will move from supine to sit and sit to supine , scoot up and down and roll side to side in bed with total A x1 within 7 day(s). Remainder of goals will be set upon assessment. physical Therapy TREATMENT  Patient: Jeffrey Lui (89 y.o. female)  Date: 2/23/2018  Diagnosis: Stroke (cerebrum) [facility]) <principal problem not specified>       Precautions: DNR, Fall, Bed Alarm  Chart, physical therapy assessment, plan of care and goals were reviewed. ASSESSMENT:  Pt received supine in bed with family present and agreeable to mobilize OOB to go to the bathroom. Pt cleared by nursing for mobility. Pt more alert, however very resistant to assistance. She required total A x 2 for all safe functional mobility this date and was frequently pushing and kicking therapists along with yelling. Performed bed mobility and bed<>BSC transfer, however pt unable to hold weight through BLEs, needing total A to remain upright. She needed total A for toileting hygiene. Educated daughter on needing physical assist x 2 for all safe functional mobility, use of BSC, safe body mechanics and use of gait belt with proper transfer techniques, and limiting gait with RW as pt is not safe to perform despite assist at this time. Daughter slightly resistant to education, reporting that patient can ambulate, she was only resistant to therapists making is harder for her to perform. Pt is a HIGH fall risk, however likely may improve cognitively some with transition home to her normal environment. Pt was left supine in bed with all needs met, RN aware, and daughter present following therapy session. Recommend patient return home with 24/7 assistance x 2 and HHPT for home safety evaluation.     Progression toward goals:  []    Improving appropriately and progressing toward goals  [x] Improving slowly and progressing toward goals  []    Not making progress toward goals and plan of care will be adjusted     PLAN:  Patient continues to benefit from skilled intervention to address the above impairments. Continue treatment per established plan of care. Discharge Recommendations:  Home Health and 24/7 physical assistance x 2  Further Equipment Recommendations for Discharge:  None - owns RW, W/C, BSC, gait belt     SUBJECTIVE:   Patient stated Get off of me!    OBJECTIVE DATA SUMMARY:   Critical Behavior:  Neurologic State: Alert  Orientation Level: Oriented to person  Cognition: Unable to assess (comment)  Safety/Judgement: Lack of insight into deficits  Functional Mobility Training:  Bed Mobility:  Rolling: Total assistance  Supine to Sit: Total assistance  Sit to Supine: Total assistance  Scooting: Total assistance        Transfers:  Sit to Stand: Total assistance;Assist x2  Stand to Sit: Total assistance;Assist x2     Stand Pivot Transfers: Total assistance                       Balance:  Sitting: Impaired  Sitting - Static: Fair (occasional); Poor (constant support)  Sitting - Dynamic: Poor (constant support)  Standing: Impaired; With support  Standing - Static: Poor  Standing - Dynamic : Poor  Ambulation/Gait Training:             Pain:  Pain Scale 1: Numeric (0 - 10)  Pain Intensity 1: 0              Activity Tolerance:   Poor - pt very resistant to all assistance  Please refer to the flowsheet for vital signs taken during this treatment.   After treatment:   []    Patient left in no apparent distress sitting up in chair  [x]    Patient left in no apparent distress in bed  [x]    Call bell left within reach  [x]    Nursing notified  [x]    Caregiver present  []    Bed alarm activated    COMMUNICATION/COLLABORATION:   The patients plan of care was discussed with: Physical Therapist, Occupational Therapist, Registered Nurse and     Mary Toro PT, DPT   Time Calculation: 24 mins

## 2018-02-23 NOTE — PROGRESS NOTES
Occupational Therapy EVALUATION/discharge  Patient: Jeffrey Lui (89 y.o. female)  Date: 2/23/2018  Primary Diagnosis: Stroke (cerebrum) [facility])        Precautions:  DNR, Fall, Bed Alarm    ASSESSMENT:   Based on the objective data described below, the patient presents with significant deficits in all areas, requiring Total A of 1 to 2. Upon entry, the daughter was feeding the patient some applesauce. When obtaining prior level of function, she indicates that the patient feeds herself \"sometimes\" and that she is Total A for bathing and dressing. Patient was agitated and screaming/swatting/kicking at therapists for the whole session. Patient's daughter was present and indicates that they have a caregiver for 8 hours 5 days a week. Recommended having family come to assist her when the caregiver is not there, if able. Her daughter seems to think that when she gets home she will be back to her baseline and be able to walk into the bathroom. Today, she required total A of 2 to transfer to/from the [facility] put right beside the bed. Educated the daughter on proper body mechanics and transfer training to keep her safe. Also recommended using the [facility] at first, as patient is not appropriate to ambulate into the bathroom at home. She indicates she has one and that they will use it if needed. Further skilled acute occupational therapy is not indicated at this time. Discharge Recommendations: Home Health (briefly for caregiver training and safety evaluation)  Further Equipment Recommendations for Discharge: None (they have all needed equipment)      SUBJECTIVE:   Patient stated [place] me alone!  \"Get out of my way! I'm gonna kick you! \"    OBJECTIVE DATA SUMMARY:   HISTORY:   Past Medical History:   Diagnosis Date    Alzheimer disease     Hypertension      Past Surgical History:   Procedure Laterality Date    HX ORTHOPAEDIC      bilateral knee replacement       Prior Level of Function/Environment/Context: see above  Expanded or extensive additional review of patient history:     Home Situation  Home Environment: Private residence  Wheelchair Ramp: Yes  One/Two Story Residence: Two story, live on 1st floor  Living Alone: No  Support Systems: Child(ton)  Patient Expects to be Discharged to[de-identified] Private residence  Current DME Used/Available at Home: Kemi Callander, Wheelchair  Tub or Shower Type: Tub/Shower combination  [x]  Right hand dominant   []  Left hand dominant    EXAMINATION OF PERFORMANCE DEFICITS:  Cognitive/Behavioral Status:  Neurologic State: Alert;Confused;Irritable  Orientation Level: Disoriented to place; Disoriented to time;Disoriented to situation;Oriented to person  Cognition: Decreased attention/concentration; Impaired decision making;Memory loss;Poor safety awareness; No command following        Safety/Judgement: Lack of insight into deficits; Decreased awareness of need for safety;Decreased awareness of need for assistance      Hearing: Auditory  Auditory Impairment: Hard of hearing, bilateral    Vision/Perceptual:    Unable to assess due to cognition                                Range of Motion:  AROM: Grossly decreased, non-functional (resistive to ROM)                         Strength:  Strength: Grossly decreased, non-functional (resistive to ROM)                Tone & Sensation:  Not tested                         Balance:  Sitting: Impaired  Sitting - Static: Fair (occasional); Poor (constant support)  Sitting - Dynamic: Poor (constant support)  Standing: Impaired; With support  Standing - Static: Poor  Standing - Dynamic : Poor    Functional Mobility and Transfers for ADLs:  Bed Mobility:  Rolling: Total assistance  Supine to Sit: Total assistance  Sit to Supine: Total assistance  Scooting: Total assistance    Transfers:  Sit to Stand: Total assistance;Assist x2  Stand to Sit: Total assistance;Assist x2  Toilet Transfer : Total assistance;Assist x2    ADL Assessment:  Feeding:  Total assistance    Oral Facial Hygiene/Grooming: Total assistance    Bathing: Total assistance    Upper Body Dressing: Total assistance    Lower Body Dressing: Total assistance;Assist x2    Toileting: Total assistance                ADL Intervention and task modifications:  Caregiver education provided as detailed above. Cognitive Retraining  Safety/Judgement: Lack of insight into deficits; Decreased awareness of need for safety;Decreased awareness of need for assistance    Functional Measure:  Barthel Index:    Bathin  Bladder: 0  Bowels: 0  Groomin  Dressin  Feedin  Mobility: 0  Stairs: 0  Toilet Use: 0  Transfer (Bed to Chair and Back): 0  Total: 0       Barthel and G-code impairment scale:  Percentage of impairment CH  0% CI  1-19% CJ  20-39% CK  40-59% CL  60-79% CM  80-99% CN  100%   Barthel Score 0-100 100 99-80 79-60 59-40 20-39 1-19   0   Barthel Score 0-20 20 17-19 13-16 9-12 5-8 1-4 0      The Barthel ADL Index: Guidelines  1. The index should be used as a record of what a patient does, not as a record of what a patient could do. 2. The main aim is to establish degree of independence from any help, physical or verbal, however minor and for whatever reason. 3. The need for supervision renders the patient not independent. 4. A patient's performance should be established using the best available evidence. Asking the patient, friends/relatives and nurses are the usual sources, but direct observation and common sense are also important. However direct testing is not needed. 5. Usually the patient's performance over the preceding 24-48 hours is important, but occasionally longer periods will be relevant. 6. Middle categories imply that the patient supplies over 50 per cent of the effort. 7. Use of aids to be independent is allowed. Fernanda Sue., Barthel, D.W. (8117). Functional evaluation: the Barthel Index. [facility] (14)2.   GOLDEN Hackett, Magan De Los Santos., Octavia Mccormick., Yessica Hartman. (1999). Measuring the change indisability after inpatient rehabilitation; comparison of the responsiveness of the Barthel Index and Functional [place] Measure. Journal of Neurology, Neurosurgery, and Psychiatry, 66(4), 105-141. EDITH Robbins, SIMA Crane, & Guadalupe Rivas M.A. (2004.) Assessment of post-stroke quality of life in cost-effectiveness studies: The usefulness of the Barthel Index and the EuroQoL-5D. Quality of Life Research, 13, 305-07       G codes: In compliance with CMSs Claims Based Outcome Reporting, the following G-code set was chosen for this patient based on their primary functional limitation being treated: The outcome measure chosen to determine the severity of the functional limitation was the Barthel Index with a score of 0/100 which was correlated with the impairment scale. ? Self Care:     - CURRENT STATUS: CN - 100% impaired, limited or restricted    - GOAL STATUS: CN - 100% impaired, limited or restricted    - D/C STATUS:  CN - 100% impaired, limited or restricted     Occupational Therapy Evaluation Charge Determination   History Examination Decision-Making   MEDIUM Complexity : Expanded review of history including physical, cognitive and psychosocial  history  HIGH Complexity : 5 or more performance deficits relating to physical, cognitive , or psychosocial skils that result in activity limitations and / or participation restrictions HIGH Complexity : Patient presents with comorbidities that affect occupational performance.  Signifigant modification of tasks or assistance (eg, physical or verbal) with assessment (s) is necessary to enable patient to complete evaluation       Based on the above components, the patient evaluation is determined to be of the following complexity level: MEDIUM  Pain:  Pain Scale 1: Numeric (0 - 10)  Pain Intensity 1: 0              Activity Tolerance:   Poor  After treatment:   []  Patient left in no apparent distress sitting up in chair  [x]  Patient left in no apparent distress in bed  [x]  Call bell left within reach  [x]  Nursing notified  [x]  Caregiver present  []  Bed alarm activated    COMMUNICATION/EDUCATION:   Communication/Collaboration:  [x]      Home safety education was provided and the patient/caregiver indicated understanding. []      Patient/family have participated as able and agree with findings and recommendations. [x]      Patient is unable to participate in plan of care at this time.   Findings and recommendations were discussed with: Physical Therapist, Registered Nurse and [address] [place] [address] Road, OTR/L  Time Calculation: 29 mins

## 2018-02-23 NOTE — PROGRESS NOTES
* No surgery found *  * No surgery found *  Bedside shift change report given to Letty Koyanagi RN(oncoming nurse) by [address] [place] [address]). Report included the following information [facility]. Zone Phone:   [phone]      Significant changes during shift:  Confused gets agitated attempts to hurt staff . Patient Information    Eze Marshall  80 y.o.  2/21/2018  3:56 PM by Carolina Melton MD. Eze Marshall was admitted from Home    Problem List    Patient Active Problem List    Diagnosis Date Noted    Stroke (cerebrum) ([facility] 02/21/2018    Hip fracture ([facility] 09/27/2013    Dementia 09/27/2013    HTN (hypertension) 09/27/2013     Past Medical History:   Diagnosis Date    Alzheimer disease     Hypertension          Core Measures:    CVA: Yes Yes  CHF:No No  PNA:No No      Activity Status:    OOB to Chair No  Ambulated this shift No   Bed Rest Yes    Supplemental O2: (If Applicable)    NC Yes  NRB No  Venti-mask Not applicable  On 2 Liters/min      LINES AND DRAINS:    DVT prophylaxis:    DVT prophylaxis Med- yes  DVT prophylaxis SCD or DARRIAN- No     Wounds: (If Applicable)    Wounds- Yes    Location rt heel    Patient Safety:    Falls Score Total Score: 3  Safety Level_______  Bed Alarm On? Yes  Sitter? No    Plan for upcoming shift:  Safety. Speech and PT OT when patient more awake        Discharge Plan: Yes home when at baseline    Active Consults:  [facility]

## 2018-02-23 NOTE — DISCHARGE SUMMARY
Discharge Summary       PATIENT ID: Roseann Bourne  MRN: [id number]   YOB: 1916    DATE OF ADMISSION: 2/21/2018  3:56 PM    DATE OF DISCHARGE: 2/23/18   PRIMARY CARE PROVIDER: Rocio Bach MD     ATTENDING PHYSICIAN: Kera Dockery MD  DISCHARGING PROVIDER: Jovita Melton MD    To contact this individual call [phone] and ask the  to page. If unavailable ask to be transferred the Adult Hospitalist Department. CONSULTATIONS: IP CONSULT TO NEUROLOGY    PROCEDURES/SURGERIES: * No surgery found *    ADMITTING DIAGNOSES & HOSPITAL COURSE:    8 year old female with history of Alzheimer's dementia, prior CVA, HTN, prior right hip fracture presented on 2/21/18 with slurred speech and right facial droop. She was diagnosed with possible CVA. 1.  Slurred speech, right facial droop, concerning for possible left pontine CVA   - family had declined tPA on admission   - CT head 2/21 - Atrophy and chronic small vessel ischemic disease the white matter. Artifact versus true diminished attenuation in left anterolateral aspect of superior iker. The possibility of acute or subacute infarction in this area is not excluded. - she had previously been on aspirin, seemingly failed therapy with this, changed to clopidogrel   - with her advanced age, unclear if there is any benefit from statin   - seen by neurology - no further recommendations   - seen by PT/OT/SLP - family wanted to bring patient home, so home health arranged. She had passed a swallow evaluation with recommendation for dysphagia diet.     2. Alzheimer's dementia   - supportive care     3. HTN   - resume patient's home [place] on discharge, she was allowed some degree of permissive hypertension in setting of suspected CVA        DISCHARGE DIAGNOSES / PLAN:      1. See above. PENDING TEST RESULTS:   At the time of discharge the following test results are still pending: None.     FOLLOW UP APPOINTMENTS:    Follow-up Information Follow up With Details Comments Contact Seth Malone MD Schedule an appointment as soon as possible for a visit in 1 week  [address] [facility] [place] [phone]             ADDITIONAL CARE RECOMMENDATIONS:     Stop aspirin, you were started on a new medication for stroke called Plavix. The biggest side effect or risk is GI bleeds. If you notice dark stool or bleeding, please either call your primary care provider or come to the hospital for further evaluation. DIET: Dysphagia 3, mechanical soft, thin liquids    ACTIVITY: PT/OT per Home Health    WOUND CARE: N/A    EQUIPMENT needed: N/A          DISCHARGE MEDICATIONS:  Current Discharge Medication List      START taking these medications    Details   clopidogrel (PLAVIX) 75 mg tab Take 1 Tab by mouth daily. Qty: 30 Tab, Refills: 0         CONTINUE these medications which have NOT CHANGED    Details   diazePAM (VALIUM) 5 mg/5 mL (1 mg/mL, 5 mL) soln oral solution Take 5 mg by mouth two (2) times daily as needed (Anxiety). bisoprolol-hydroCHLOROthiazide (ZIAC) 5-6.25 mg per tablet Take 1 Tab by mouth daily. MULTIVIT-MINERALS/FOLIC ACID (ADULT ONE DAILY GUMMIES PO) Take 1 Tab by mouth daily. ascorbic acid, vitamin C, (VITAMIN C) 500 mg tablet Take 500 mg by mouth daily. STOP taking these medications       aspirin 81 mg chewable tablet Comments:   Reason for Stopping:         diazePAM (VALIUM) 5 mg tablet Comments:   Reason for Stopping:                 NOTIFY YOUR PHYSICIAN FOR ANY OF THE FOLLOWING:   Fever over 101 degrees for 24 hours. Chest pain, shortness of breath, fever, chills, nausea, vomiting, diarrhea, change in mentation, falling, weakness, bleeding. Severe pain or pain not relieved by medications. Or, any other signs or symptoms that you may have questions about.     DISPOSITION:  x  Home With:   OT x PT x JAYDA  RN       Long term SNF/Inpatient Rehab Independent/assisted living    Hospice    Other:       PATIENT CONDITION AT DISCHARGE:     Functional status    Poor    x Deconditioned     Independent      Cognition     Lucid     Forgetful    x Dementia      Catheters/lines (plus indication)    Zamarripa     PICC     PEG    x None      Code status     Full code    x DNR      PHYSICAL EXAMINATION AT DISCHARGE:  Visit Vitals    /61 (BP 1 Location: Right arm, BP Patient Position: At rest)    Pulse 62    Temp 97.7 °F (36.5 °C)    Resp 18    Ht 5' 1\" (1.549 m)    Wt 62.1 kg (136 lb 14.5 oz)    SpO2 93%    BMI 25.87 kg/m2     General:  Alert, cooperative, no distress, appears stated age. Head:  Normocephalic, without obvious abnormality, atraumatic. Eyes:  Conjunctivae/corneas clear. PERRL, EOMs intact. Nose: Nares normal. Septum midline. Mucosa normal. No drainage or sinus tenderness. Throat: Lips, mucosa, and tongue normal. Teeth and gums normal.   Neck: Supple, symmetrical, trachea midline, no adenopathy, thyroid: no enlargement/tenderness/nodules, no carotid bruit and no JVD. Back:   Symmetric, no curvature. ROM normal. No CVA tenderness. Lungs:   Clear to auscultation bilaterally. Chest wall:  No tenderness or deformity. Heart:  Regular rate and rhythm, S1, S2 normal, no murmur, click, rub or gallop. Abdomen:   Soft, non-tender. Bowel sounds normal. No masses,  No organomegaly. Extremities: Extremities normal, atraumatic, no cyanosis or edema. Pulses: 2+ and symmetric all extremities. Skin: Skin color, texture, turgor normal. No rashes or lesions   Neurologic: CNII-XII intact. Motor strength grossly 4-4+/5 in distal extremities. No appreciable facial droop.          CHRONIC MEDICAL DIAGNOSES:  Problem List as of 2/23/2018  Date Reviewed: 10/1/2013          Codes Class Noted - Resolved    Stroke (cerebrum) [facility]) ICD-10-CM: I63.9  ICD-9-CM: 434.91  2/21/2018 - Present        [facility] ([facility] ICD-10-CM: L31.375D  ICD-9-CM: 820.8 9/27/2013 - Present        Dementia ICD-10-CM: F03.90  ICD-9-CM: 294.20  9/27/2013 - Present        HTN (hypertension) ICD-10-CM: I10  ICD-9-CM: 401.9  9/27/2013 - Present              Greater than 35 minutes were spent with the patient on counseling and coordination of care    Signed:   Claudene Heal, MD  2/23/2018  9:27 AM

## 2020-03-12 NOTE — ROUTINE PROCESS
TRANSFER - OUT REPORT:    Verbal report given to [facility] - ZACHARY RN(name) on Kathlyn Schilder  being transferred to (unit) for routine progression of care       Report consisted of patients Situation, Background, Assessment and   Recommendations(SBAR). Information from the following report(s) SBAR, Kardex, ED Summary, Intake/Output, MAR, Accordion, Recent Results and Cardiac Rhythm NSR was reviewed with the receiving nurse. Lines:   Peripheral IV 02/21/18 Right Forearm (Active)   Site Assessment Clean, dry, & intact 2/22/2018 10:00 AM   Phlebitis Assessment 0 2/22/2018 10:00 AM   Infiltration Assessment 0 2/22/2018 10:00 AM   Dressing Status Clean, dry, & intact 2/22/2018 10:00 AM   Dressing Type Transparent;Tape 2/22/2018 10:00 AM   Hub Color/Line Status Pink; Infusing 2/22/2018 10:00 AM        Opportunity for questions and clarification was provided.       Patient transported with:   [facility]  Informed this am family would like Hospice meeting set up for 9:30am changed to 11 am.  [address] at [phone] spoke with Kenny Benavidez and time has now been changed to 11:30am  Called pt's son Ju Gamino at [phone] no answer left message on VM  Called dtr Loan Overton at [phone] informed her time had been changed to 11:30am per request.  She stated she would let all her family know of the time change. Josr Grandchild Rn updated with time change 11:30am with Hospice [facility]        11:30  [id number] met with pt plan to move pt to [address] [id number] once they have bed ready today. They will call CM to let me know when bed will be ready to arrange transportation      17:15  Plan [facility] pickup at 8:30pm to [facility] location  [place] pt's daughter Shweta Mckee and notified of transport time she wanted me to call Hospice to inform them to call her when the pt arrives at [facility]. Called Hospice spoke withAna informed her pt's dtr wanted to be called when he arrived.     Discharge [facility] Case Management Department  Written by: Steven Sheets RN    Patient Name: Jeannette Davis  Attending Provider: Dagmar Torres,*  Admit Date: 3/5/2020  4:47 PM  MRN: [id number]  Account: [de-identified]                     : 1961  Discharge Date: 3/12/2020      Disposition: [facility] location per Durel Hashimoto, RN
